# Patient Record
Sex: FEMALE | Race: BLACK OR AFRICAN AMERICAN | NOT HISPANIC OR LATINO
[De-identification: names, ages, dates, MRNs, and addresses within clinical notes are randomized per-mention and may not be internally consistent; named-entity substitution may affect disease eponyms.]

---

## 2017-02-08 ENCOUNTER — APPOINTMENT (OUTPATIENT)
Dept: ORTHOPEDIC SURGERY | Facility: CLINIC | Age: 52
End: 2017-02-08

## 2017-02-08 VITALS — SYSTOLIC BLOOD PRESSURE: 151 MMHG | HEART RATE: 76 BPM | DIASTOLIC BLOOD PRESSURE: 99 MMHG

## 2017-02-08 VITALS — BODY MASS INDEX: 26.58 KG/M2 | WEIGHT: 150 LBS | HEIGHT: 63 IN

## 2017-02-08 DIAGNOSIS — Z78.9 OTHER SPECIFIED HEALTH STATUS: ICD-10-CM

## 2017-02-08 DIAGNOSIS — Z87.39 PERSONAL HISTORY OF OTHER DISEASES OF THE MUSCULOSKELETAL SYSTEM AND CONNECTIVE TISSUE: ICD-10-CM

## 2018-05-23 ENCOUNTER — APPOINTMENT (OUTPATIENT)
Dept: INTERNAL MEDICINE | Facility: CLINIC | Age: 53
End: 2018-05-23

## 2018-11-26 ENCOUNTER — INPATIENT (INPATIENT)
Facility: HOSPITAL | Age: 53
LOS: 1 days | Discharge: HOME HEALTH | End: 2018-11-28
Attending: HOSPITALIST | Admitting: HOSPITALIST
Payer: MEDICAID

## 2018-11-26 VITALS
SYSTOLIC BLOOD PRESSURE: 145 MMHG | TEMPERATURE: 98 F | DIASTOLIC BLOOD PRESSURE: 86 MMHG | RESPIRATION RATE: 16 BRPM | HEART RATE: 88 BPM | OXYGEN SATURATION: 100 %

## 2018-11-26 DIAGNOSIS — R13.12 DYSPHAGIA, OROPHARYNGEAL PHASE: ICD-10-CM

## 2018-11-26 DIAGNOSIS — E78.5 HYPERLIPIDEMIA, UNSPECIFIED: ICD-10-CM

## 2018-11-26 DIAGNOSIS — Z29.9 ENCOUNTER FOR PROPHYLACTIC MEASURES, UNSPECIFIED: ICD-10-CM

## 2018-11-26 DIAGNOSIS — R13.10 DYSPHAGIA, UNSPECIFIED: ICD-10-CM

## 2018-11-26 LAB
ALBUMIN SERPL ELPH-MCNC: 4.7 G/DL — SIGNIFICANT CHANGE UP (ref 3.3–5)
ALP SERPL-CCNC: 69 U/L — SIGNIFICANT CHANGE UP (ref 40–120)
ALT FLD-CCNC: 16 U/L — SIGNIFICANT CHANGE UP (ref 4–33)
AST SERPL-CCNC: 20 U/L — SIGNIFICANT CHANGE UP (ref 4–32)
BASOPHILS # BLD AUTO: 0.03 K/UL — SIGNIFICANT CHANGE UP (ref 0–0.2)
BASOPHILS NFR BLD AUTO: 0.7 % — SIGNIFICANT CHANGE UP (ref 0–2)
BILIRUB SERPL-MCNC: 0.7 MG/DL — SIGNIFICANT CHANGE UP (ref 0.2–1.2)
BUN SERPL-MCNC: 5 MG/DL — LOW (ref 7–23)
CALCIUM SERPL-MCNC: 9.9 MG/DL — SIGNIFICANT CHANGE UP (ref 8.4–10.5)
CHLORIDE SERPL-SCNC: 103 MMOL/L — SIGNIFICANT CHANGE UP (ref 98–107)
CO2 SERPL-SCNC: 25 MMOL/L — SIGNIFICANT CHANGE UP (ref 22–31)
CREAT SERPL-MCNC: 0.84 MG/DL — SIGNIFICANT CHANGE UP (ref 0.5–1.3)
EOSINOPHIL # BLD AUTO: 0.02 K/UL — SIGNIFICANT CHANGE UP (ref 0–0.5)
EOSINOPHIL NFR BLD AUTO: 0.4 % — SIGNIFICANT CHANGE UP (ref 0–6)
GLUCOSE SERPL-MCNC: 87 MG/DL — SIGNIFICANT CHANGE UP (ref 70–99)
HCT VFR BLD CALC: 43.1 % — SIGNIFICANT CHANGE UP (ref 34.5–45)
HGB BLD-MCNC: 14.4 G/DL — SIGNIFICANT CHANGE UP (ref 11.5–15.5)
IMM GRANULOCYTES # BLD AUTO: 0.01 # — SIGNIFICANT CHANGE UP
IMM GRANULOCYTES NFR BLD AUTO: 0.2 % — SIGNIFICANT CHANGE UP (ref 0–1.5)
LYMPHOCYTES # BLD AUTO: 2.12 K/UL — SIGNIFICANT CHANGE UP (ref 1–3.3)
LYMPHOCYTES # BLD AUTO: 46.2 % — HIGH (ref 13–44)
MCHC RBC-ENTMCNC: 29.9 PG — SIGNIFICANT CHANGE UP (ref 27–34)
MCHC RBC-ENTMCNC: 33.4 % — SIGNIFICANT CHANGE UP (ref 32–36)
MCV RBC AUTO: 89.6 FL — SIGNIFICANT CHANGE UP (ref 80–100)
MONOCYTES # BLD AUTO: 0.32 K/UL — SIGNIFICANT CHANGE UP (ref 0–0.9)
MONOCYTES NFR BLD AUTO: 7 % — SIGNIFICANT CHANGE UP (ref 2–14)
NEUTROPHILS # BLD AUTO: 2.09 K/UL — SIGNIFICANT CHANGE UP (ref 1.8–7.4)
NEUTROPHILS NFR BLD AUTO: 45.5 % — SIGNIFICANT CHANGE UP (ref 43–77)
NRBC # FLD: 0 — SIGNIFICANT CHANGE UP
PLATELET # BLD AUTO: 215 K/UL — SIGNIFICANT CHANGE UP (ref 150–400)
PMV BLD: 11.4 FL — SIGNIFICANT CHANGE UP (ref 7–13)
POTASSIUM SERPL-MCNC: 3.9 MMOL/L — SIGNIFICANT CHANGE UP (ref 3.5–5.3)
POTASSIUM SERPL-SCNC: 3.9 MMOL/L — SIGNIFICANT CHANGE UP (ref 3.5–5.3)
PROT SERPL-MCNC: 7.9 G/DL — SIGNIFICANT CHANGE UP (ref 6–8.3)
RBC # BLD: 4.81 M/UL — SIGNIFICANT CHANGE UP (ref 3.8–5.2)
RBC # FLD: 13.7 % — SIGNIFICANT CHANGE UP (ref 10.3–14.5)
SODIUM SERPL-SCNC: 141 MMOL/L — SIGNIFICANT CHANGE UP (ref 135–145)
WBC # BLD: 4.59 K/UL — SIGNIFICANT CHANGE UP (ref 3.8–10.5)
WBC # FLD AUTO: 4.59 K/UL — SIGNIFICANT CHANGE UP (ref 3.8–10.5)

## 2018-11-26 PROCEDURE — 70491 CT SOFT TISSUE NECK W/DYE: CPT | Mod: 26

## 2018-11-26 PROCEDURE — 99223 1ST HOSP IP/OBS HIGH 75: CPT

## 2018-11-26 PROCEDURE — 70360 X-RAY EXAM OF NECK: CPT | Mod: 26

## 2018-11-26 PROCEDURE — 71046 X-RAY EXAM CHEST 2 VIEWS: CPT | Mod: 26

## 2018-11-26 RX ORDER — SODIUM CHLORIDE 9 MG/ML
1000 INJECTION INTRAMUSCULAR; INTRAVENOUS; SUBCUTANEOUS ONCE
Qty: 0 | Refills: 0 | Status: COMPLETED | OUTPATIENT
Start: 2018-11-26 | End: 2018-11-26

## 2018-11-26 RX ORDER — SODIUM CHLORIDE 9 MG/ML
1000 INJECTION, SOLUTION INTRAVENOUS
Qty: 0 | Refills: 0 | Status: DISCONTINUED | OUTPATIENT
Start: 2018-11-26 | End: 2018-11-27

## 2018-11-26 RX ADMIN — SODIUM CHLORIDE 1000 MILLILITER(S): 9 INJECTION INTRAMUSCULAR; INTRAVENOUS; SUBCUTANEOUS at 18:28

## 2018-11-26 NOTE — H&P ADULT - PROBLEM SELECTOR PLAN 3
IMPROVE VTE Individual Risk Assessment    RISK                                                          Points  [] Previous VTE                                           3  [] Thrombophilia                                        2  [] Lower limb paralysis                              2   [] Current Cancer                                       2   [] Immobilization > 24 hrs                        1  [] ICU/CCU stay > 24 hours                       1  [] Age > 60                                                   1    IMPROVE VTE Score: 0  scd

## 2018-11-26 NOTE — H&P ADULT - HISTORY OF PRESENT ILLNESS
54 yo f who initially  reports several days of increases saliva production. On further query, pt reports difficulty with swallowing liquids or solids even right after spitting out saliva. Difficulty at times marked by regurgitation of po content immediately after swallowing. Denies pain with swallowing, fevers, rigors, nausea, vomiting, abdominal pain, weight loss. CT neck + for  8mm  sialolith with no evidence of sialoadenitis. Pt reports felling lump there but denies pain or swelling 52 yo f who initially  reports several days of increases saliva production. On further query, pt reports difficulty with swallowing liquids or solids even right after spitting out saliva. Difficulty at times marked by regurgitation of po content immediately after swallowing. Denies pain with swallowing, fevers, rigors, nausea, vomiting, abdominal pain, weight loss. Pt with limited po intake over last 4 days. CT neck + for  8mm  sialolith with no evidence of sialoadenitis. Pt reports felling lump there but denies pain or swelling.   Pt with h/o right knee OA, HLD

## 2018-11-26 NOTE — ED PROVIDER NOTE - OBJECTIVE STATEMENT
53y F with no significant PMHx presents to the ED with 3 days of increased mucous production. States subjective difficulty swallowing due to amount of mucous. Pt is swallowing secretions. Denies pain. No cough or SOB. No trauma. No recent URI 53y F with no significant PMHx presents to the ED with 3 days of increased mucous production. States subjective difficulty swallowing due to amount of mucous. Pt though  is  able to swallow secretions. Denies pain. No cough or SOB. No trauma. No recent URI, denies fb ingestion

## 2018-11-26 NOTE — ED PROVIDER NOTE - PROGRESS NOTE DETAILS
CHAYO Izaguirre: Pt was given a cup of water and crackers and pt was not able to swallow.  Pt states she feels as though she is unable to get water down, but does not have any pain.  Given inability to tolerate PO liquids and solids, will admit.  Neuro consulted at this time.  Pt states she has not been able to eat any food for past three days and today was having difficulty swallowing gatorade. CHAYO CROWELL:  Pt accepted to Dr. Mccall.   MAR text paged at this time.

## 2018-11-26 NOTE — H&P ADULT - NSHPREVIEWOFSYSTEMS_GEN_ALL_CORE
Review of Systems:   CONSTITUTIONAL: No fever, weight loss, or fatigue  EYES: No eye pain, visual disturbances, or discharge  ENMT:  No difficulty hearing, tinnitus, vertigo; No sinus or throat pain  NECK: No pain or stiffness, mild prominence in left anterior neck  RESPIRATORY: No cough, wheezing, chills or hemoptysis; No shortness of breath  CARDIOVASCULAR: No chest pain, palpitations, dizziness, or leg swelling  GASTROINTESTINAL: + dysphagia . No abdominal or epigastric pain. No nausea, vomiting, or hematemesis; No diarrhea or constipation. No melena or hematochezia.  GENITOURINARY: No dysuria, frequency, hematuria, or incontinence  NEUROLOGICAL: No headaches, memory loss, loss of strength, numbness, or tremors  SKIN: No itching, burning, rashes, or lesions   LYMPH NODES: No enlarged glands  ENDOCRINE: No heat or cold intolerance; No hair loss  MUSCULOSKELETAL: No joint pain or swelling; No muscle, back, or extremity pain

## 2018-11-26 NOTE — H&P ADULT - PROBLEM SELECTOR PLAN 1
-would order MBS, will email GI   -IVF for hydration in setting of limited po intake as well as salivary stone  -npo for now

## 2018-11-26 NOTE — H&P ADULT - NSHPPHYSICALEXAM_GEN_ALL_CORE
PHYSICAL EXAM:      Constitutional: NAD, well-groomed, well-developed  HEENT: EOMI, Normal Hearing  Neck: left cervical anterior subcentimeter prominence   Back: Normal spine flexure, No CVA tenderness  Respiratory: CTAB  Cardiovascular: S1 and S2  Gastrointestinal: BS+, soft, NT/ND  Extremities: No peripheral edema  Vascular: 2+ peripheral pulses  Neurological: A/O x 3, no focal deficits  Psychiatric: Normal mood, normal affect  Musculoskeletal: 5/5 strength b/l upper and lower extremities  Skin: No rashes

## 2018-11-26 NOTE — ED PROVIDER NOTE - MEDICAL DECISION MAKING DETAILS
Increased mucous production. Subjective difficulty swallowing. Check CXR and XR of soft tissue neck. If negative dc home to follow up with ENT

## 2018-11-26 NOTE — ED PROVIDER NOTE - ENMT, MLM
Oropharynx no erythema, edema, or exudate. No masses palpated. Thyroid no goiter. Thyroid not enlarged

## 2018-11-26 NOTE — ED PROVIDER NOTE - NS_ ATTENDINGSCRIBEDETAILS _ED_A_ED_FT
The scribe's documentation has been prepared under my direction and personally reviewed by me in its entirety. I confirm that the note above accurately reflects all work, treatment, procedures, and medical decision making performed by Sam Holman MD

## 2018-11-26 NOTE — ED PROVIDER NOTE - CONSTITUTIONAL, MLM
normal... Well appearing, well nourished, awake, alert, oriented to person, place, time/situation and in no apparent distress. Moderate obesity

## 2018-11-26 NOTE — ED PROVIDER NOTE - ATTENDING CONTRIBUTION TO CARE
DR. MUJICA, UPFRONT ATTENDING MD-  I was the attending upfront in Triage today and I performed the initial face to face bedside interview with patient regarding history of present illness, review of symptoms and past medical history. I completed an independent physical exam.  Since I was the inital provider who evalauted this patient, the history, ROS and examination was documented by me in the note.  I have signed out the follow up of any pending tests (ie. labs and/or radiological studies) to the PA.  I have discussed patient's plan of care and disposition with PA.

## 2018-11-26 NOTE — H&P ADULT - NSHPLABSRESULTS_GEN_ALL_CORE
Vital Signs Last 24 Hrs  T(C): 36.1 (26 Nov 2018 19:32), Max: 36.9 (26 Nov 2018 13:44)  T(F): 97 (26 Nov 2018 19:32), Max: 98.5 (26 Nov 2018 13:44)  HR: 78 (26 Nov 2018 19:32) (78 - 88)  BP: 150/97 (26 Nov 2018 19:32) (141/91 - 150/97)  BP(mean): --  RR: 18 (26 Nov 2018 19:32) (16 - 18)  SpO2: 100% (26 Nov 2018 19:32) (100% - 100%)                            14.4   4.59  )-----------( 215      ( 26 Nov 2018 18:10 )             43.1     11-26    141  |  103  |  5<L>  ----------------------------<  87  3.9   |  25  |  0.84    Ca    9.9      26 Nov 2018 18:10    TPro  7.9  /  Alb  4.7  /  TBili  0.7  /  DBili  x   /  AST  20  /  ALT  16  /  AlkPhos  69  11-26    CAPILLARY BLOOD GLUCOSE

## 2018-11-26 NOTE — ED ADULT TRIAGE NOTE - CHIEF COMPLAINT QUOTE
pt comes to ED for SOB for 3 days related to having phlegm in throat. pt VSS pt took OTC without relief. pt resp even and unlabored. EKG obtained pt sating 100% on RA

## 2018-11-27 ENCOUNTER — TRANSCRIPTION ENCOUNTER (OUTPATIENT)
Age: 53
End: 2018-11-27

## 2018-11-27 LAB — GLUCOSE BLDC GLUCOMTR-MCNC: 98 MG/DL — SIGNIFICANT CHANGE UP (ref 70–99)

## 2018-11-27 PROCEDURE — 99221 1ST HOSP IP/OBS SF/LOW 40: CPT | Mod: GC

## 2018-11-27 PROCEDURE — 99233 SBSQ HOSP IP/OBS HIGH 50: CPT | Mod: GC

## 2018-11-27 PROCEDURE — 74230 X-RAY XM SWLNG FUNCJ C+: CPT | Mod: 26

## 2018-11-27 RX ORDER — INFLUENZA VIRUS VACCINE 15; 15; 15; 15 UG/.5ML; UG/.5ML; UG/.5ML; UG/.5ML
0.5 SUSPENSION INTRAMUSCULAR ONCE
Qty: 0 | Refills: 0 | Status: DISCONTINUED | OUTPATIENT
Start: 2018-11-27 | End: 2018-11-28

## 2018-11-27 NOTE — SWALLOW VFSS/MBS ASSESSMENT ADULT - DIAGNOSTIC IMPRESSIONS
Patient presented with functional oropharyngeal stage of swallow for puree, solids, and thin liquids marked by adequate bolus formation, transfer, and clearance, prompt swallow trigger, adequate hyolaryngeal elevation, adequate epiglottic deflection, adequate closure of the laryngeal vestibule and no penetration/aspiration/stasis before, during, or after the swallow.

## 2018-11-27 NOTE — CONSULT NOTE ADULT - SUBJECTIVE AND OBJECTIVE BOX
Attempted to see patient to get history and perform physical exam, patient perseverated in saying that "he [the ENT specialist] was supposed to come this morning" and refused exam. Patient appeared comfortable with a clear voice and no difficulty tolerating secretions when seen. CT neck was reviewed and showed "asymmetric fatty atrophy of the left submandibular gland with an adjacent 8 mm sialolith" which, if anything, would theoretically cause decreased saliva production. The CT neck is otherwise normal and so was patient's MBS study arguing against any structural cause for patient's dysphagia so a flexible laryngoscopy would be highly unlikely to yield any finding. May reconsult ENT as needed. Discussed with attending.

## 2018-11-27 NOTE — DISCHARGE NOTE ADULT - PLAN OF CARE
Management of symptoms You came in complaining of increased phlegm production. Imaging studies found an 8 mm sialolith. No evidence for acute sialoadenitis (inflammation). Neurology and ENT came to see you. Please follow up with your primary care provider 1-2 weeks after discharge. Continued treatment of hyperlipidemia You did not take your home medication for hyperlipidemia in the hospital. Please continue taking the medication as previously prescribed at home. Please follow up with your primary care physician for continued care. You came in complaining of increased phlegm production. Imaging studies found an 8 mm sialolith. No evidence for acute sialoadenitis (inflammation). Neurology and ENT came to see you but you declined exam by ENT resident - no need for endoscopy at this time. Please follow up with your primary care provider 1-2 weeks after discharge. As long as you can swallow and are not vomiting, continue regular meals. You came in complaining of increased phlegm production. Imaging studies found an 8 mm sialolith. No evidence for acute sialoadenitis (inflammation). Neurology and ENT came to see you but you declined exam by ENT resident - no need for endoscopy at this time. Please follow up with your primary care provider 1-2 weeks after discharge. As long as you can swallow and are not vomiting, continue regular meals. If you find yourself choking or difficulty breathing that does not resolve, please go to the ED. You came in complaining of increased phlegm production. Imaging studies found an 8 mm sialolith. No evidence for acute sialoadenitis (inflammation). Neurology and ENT came to see you but you declined exam by ENT resident - no need for endoscopy at this time. You had a barium swallow study done, which showed no abnormalities. Please follow up with your primary care provider 1-2 weeks after discharge. As long as you can swallow and are not vomiting, continue regular meals. If you find yourself choking or difficulty breathing that does not resolve, please go to the ED.

## 2018-11-27 NOTE — CONSULT NOTE ADULT - CONSULT REASON
dysphagia , increased saliva production with finding of an 8mm sialolith with no evidence of sialoadenitis on CT neck
acute dysphagia

## 2018-11-27 NOTE — CONSULT NOTE ADULT - SUBJECTIVE AND OBJECTIVE BOX
Neurology Consult    Name: DEANNA CHUNG    HPI: 52 yo woman who presents to [facility] w/ 4 day onset of no PO intake due to trouble swallowing (liquids and solids), preceded by several days of increased salivary production. Othwewise unremarkable for.....       Denies pain with swallowing, fevers, rigors, nausea, vomiting, abdominal pain, weight loss. Pt with limited po intake over last 4 days. CT neck + for  8mm  sialolith with no evidence of sialoadenitis. Pt reports felling lump there but denies pain or swelling.       PMH/PSH:   HLD   OA (right knee)     MEDICATIONS  (STANDING):  sodium chloride 0.45%. 1000 milliLiter(s) (75 mL/Hr) IV Continuous <Continuous>    MEDICATIONS  (PRN):      Allergies    No Known Allergies    Intolerances        Objective:   Vital Signs Last 24 Hrs  T(C): 36.7 (26 Nov 2018 22:58), Max: 36.9 (26 Nov 2018 13:44)  T(F): 98.1 (26 Nov 2018 22:58), Max: 98.5 (26 Nov 2018 13:44)  HR: 66 (27 Nov 2018 00:05) (66 - 88)  BP: 128/75 (27 Nov 2018 00:05) (128/75 - 150/97)  BP(mean): --  RR: 18 (27 Nov 2018 00:05) (16 - 18)  SpO2: 100% (27 Nov 2018 00:05) (100% - 100%)    General Exam:   General appearance: No acute distress                   Neurological Exam:  Mental Status: AAOx3, fluent speech, follows commands    Cranial Nerves: EOMI, PERRL, V1-V3 intact, facial symmetry intact, no dysarthria, tongue midline, VFF    Motor: 5/5 throughout. No drift x4    Sensation: Intact to LT throughout    Coordination: FTN intact b/l    Reflexes: 1+ bilateral biceps, brachioradialis, patellar and ankle      Gait: normal and stable.      Labs:    11-26    141  |  103  |  5<L>  ----------------------------<  87  3.9   |  25  |  0.84    Ca    9.9      26 Nov 2018 18:10    TPro  7.9  /  Alb  4.7  /  TBili  0.7  /  DBili  x   /  AST  20  /  ALT  16  /  AlkPhos  69  11-26    LIVER FUNCTIONS - ( 26 Nov 2018 18:10 )  Alb: 4.7 g/dL / Pro: 7.9 g/dL / ALK PHOS: 69 u/L / ALT: 16 u/L / AST: 20 u/L / GGT: x           CBC Full  -  ( 26 Nov 2018 18:10 )  WBC Count : 4.59 K/uL  Hemoglobin : 14.4 g/dL  Hematocrit : 43.1 %  Platelet Count - Automated : 215 K/uL  Mean Cell Volume : 89.6 fL  Mean Cell Hemoglobin : 29.9 pg  Mean Cell Hemoglobin Concentration : 33.4 %  Auto Neutrophil # : 2.09 K/uL  Auto Lymphocyte # : 2.12 K/uL  Auto Monocyte # : 0.32 K/uL  Auto Eosinophil # : 0.02 K/uL  Auto Basophil # : 0.03 K/uL  Auto Neutrophil % : 45.5 %  Auto Lymphocyte % : 46.2 %  Auto Monocyte % : 7.0 %  Auto Eosinophil % : 0.4 %  Auto Basophil % : 0.7 %      Radiology    < from: CT Neck Soft Tissue w/ IV Cont (11.26.18 @ 20:29) >  IMPRESSION:   Asymmetric fatty atrophy of the left submandibular gland with an adjacent   8 mm sialolith.  No evidence for acute sialoadenitis.    No CT evidence of neck mass, fluid collection, inflammatory change, soft   tissue gas or radiopaque foreign body.    < end of copied text > Neurology Consult    Name: DEANNA CHUNG    HPI: 52 yo woman who presents to [facility] w/ 4 day onset of no PO intake due to trouble swallowing (liquids and solids), preceded by several days of increased salivary production. Otherwise unremarkable for headache, acute vision changes, LOC, chest pain, SOB, abdominal pain, loss of bowel/bladder, focal weakness, numbness, tingling. Patient's only vascular risk factor is HLD.       PMH/PSH:   HLD   OA (right knee)     MEDICATIONS  (STANDING):  sodium chloride 0.45%. 1000 milliLiter(s) (75 mL/Hr) IV Continuous <Continuous>    MEDICATIONS  (PRN):    Allergies  No Known Allergies    Objective:   Vital Signs Last 24 Hrs  T(C): 36.7 (26 Nov 2018 22:58), Max: 36.9 (26 Nov 2018 13:44)  T(F): 98.1 (26 Nov 2018 22:58), Max: 98.5 (26 Nov 2018 13:44)  HR: 66 (27 Nov 2018 00:05) (66 - 88)  BP: 128/75 (27 Nov 2018 00:05) (128/75 - 150/97)  BP(mean): --  RR: 18 (27 Nov 2018 00:05) (16 - 18)  SpO2: 100% (27 Nov 2018 00:05) (100% - 100%)    General Exam:   General appearance: No acute distress                   Neurological Exam:  Mental Status: AAOx3 person place time , fluent speech repetition intact names objects, follows commands    Cranial Nerves: EOMI no nystagmus no diplopia, PERRL, V1-V3 intact, facial symmetry intact, subtle dysarthria, tongue midline, VFF    Motor: 5/5 throughout including neck flexion/extension. No drift x4    Sensation: Intact to LT throughout    Coordination: FTN intact b/l    Reflexes: 1+ bilateral biceps, brachioradialis, patellar and ankle    Gait: normal and stable.      Labs:    11-26    141  |  103  |  5<L>  ----------------------------<  87  3.9   |  25  |  0.84    Ca    9.9      26 Nov 2018 18:10    TPro  7.9  /  Alb  4.7  /  TBili  0.7  /  DBili  x   /  AST  20  /  ALT  16  /  AlkPhos  69  11-26    LIVER FUNCTIONS - ( 26 Nov 2018 18:10 )  Alb: 4.7 g/dL / Pro: 7.9 g/dL / ALK PHOS: 69 u/L / ALT: 16 u/L / AST: 20 u/L / GGT: x           CBC Full  -  ( 26 Nov 2018 18:10 )  WBC Count : 4.59 K/uL  Hemoglobin : 14.4 g/dL  Hematocrit : 43.1 %  Platelet Count - Automated : 215 K/uL  Mean Cell Volume : 89.6 fL  Mean Cell Hemoglobin : 29.9 pg  Mean Cell Hemoglobin Concentration : 33.4 %  Auto Neutrophil # : 2.09 K/uL  Auto Lymphocyte # : 2.12 K/uL  Auto Monocyte # : 0.32 K/uL  Auto Eosinophil # : 0.02 K/uL  Auto Basophil # : 0.03 K/uL  Auto Neutrophil % : 45.5 %  Auto Lymphocyte % : 46.2 %  Auto Monocyte % : 7.0 %  Auto Eosinophil % : 0.4 %  Auto Basophil % : 0.7 %      Radiology    < from: CT Neck Soft Tissue w/ IV Cont (11.26.18 @ 20:29) >  IMPRESSION:   Asymmetric fatty atrophy of the left submandibular gland with an adjacent   8 mm sialolith.  No evidence for acute sialoadenitis.    No CT evidence of neck mass, fluid collection, inflammatory change, soft   tissue gas or radiopaque foreign body.    < end of copied text >

## 2018-11-27 NOTE — DISCHARGE NOTE ADULT - OTHER SIGNIFICANT FINDINGS
< from: CT Neck Soft Tissue w/ IV Cont (11.26.18 @ 20:29) >  IMPRESSION:   Asymmetric fatty atrophy of the left submandibular gland with an adjacent   8 mm sialolith.  No evidence for acute sialoadenitis.    No CT evidence of neck mass, fluid collection, inflammatory change, soft   tissue gas or radiopaque foreign body.    < end of copied text > < from: CT Neck Soft Tissue w/ IV Cont (11.26.18 @ 20:29) >  IMPRESSION:   Asymmetric fatty atrophy of the left submandibular gland with an adjacent   8 mm sialolith.  No evidence for acute sialoadenitis.    No CT evidence of neck mass, fluid collection, inflammatory change, soft   tissue gas or radiopaque foreign body.    < end of copied text >    Modified barium swallow study:  Patient presented with functional oropharyngeal stage of swallow for puree, solids, and thin liquids marked by adequate bolus formation, transfer, and clearance, prompt swallow trigger, adequate hyolaryngeal elevation, adequate epiglottic deflection, adequate closure of the laryngeal vestibule and no penetration/aspiration/stasis before, during, or after the swallow.

## 2018-11-27 NOTE — CONSULT NOTE ADULT - ATTENDING COMMENTS
Patient seen and examined with neurology team and above note reviewed and I agree with assessment and plan as outlined. Patient with 2-3 days of reported dysphagia to solids and liquids and increased mucus production. Patient without any associated neurologic symptoms to suggest neuromuscular junction disorder or motor neuron disease or brain stem involvement.  I agree with ENT eval to assess for any structural or anatomic lesion and continue medical management and supportive care.  Follow up on MBS study and all questions addressed with patient and medical team.

## 2018-11-27 NOTE — DISCHARGE NOTE ADULT - PROVIDER TOKENS
FREE:[LAST:[Maria D],FIRST:[George],PHONE:[phone]],FAX:[(   [phone]    -],ADDRESS:[[address] [place] [address]]]

## 2018-11-27 NOTE — SWALLOW VFSS/MBS ASSESSMENT ADULT - COMMENTS
Patient seen in radiology suite for modified barium swallow study with radiologist present.  Patient seated upright in CHRISTIANE chair.  Patient able to self-feed during today's evaluation. Patient reports about 3-4 days ago, she began to have severe phlegm when eating/drinking with excessive burping.

## 2018-11-27 NOTE — CONSULT NOTE ADULT - PROBLEM SELECTOR RECOMMENDATION 9
of unknown etiology. Overall neuro exam non-focal.   Plan:   -ENT evaluation  -Speech and swallow evaluation   -r/o structural etiology   -neuro checks for acute worsening, may need additional workup if symptoms persist or progress.   -will continue to follow of unknown etiology. Overall neuro exam non-focal.   Plan:   -ENT evaluation  -Speech and swallow evaluation   -r/o structural etiology   -neuro checks for acute worsening, may need additional workup if symptoms persist or progress.   -consider NIF/VC checks if dysphagia/dysarthria worsens  -will continue to follow

## 2018-11-27 NOTE — DISCHARGE NOTE ADULT - CONDITIONS AT DISCHARGE
She is alert and oriented x 4, self sufficient with her care; she is able to tolerate her diet despite c/o mild dysphagia ; Cineesophagogram was done ; Skin is intact ; Vital Signs are stable and discharge Instructions are given.

## 2018-11-27 NOTE — DISCHARGE NOTE ADULT - CARE PLAN
Principal Discharge DX:	Oropharyngeal dysphagia  Goal:	Management of symptoms  Assessment and plan of treatment:	You came in complaining of increased phlegm production. Imaging studies found an 8 mm sialolith. No evidence for acute sialoadenitis (inflammation). Neurology and ENT came to see you. Please follow up with your primary care provider 1-2 weeks after discharge.  Secondary Diagnosis:	HLD (hyperlipidemia)  Goal:	Continued treatment of hyperlipidemia  Assessment and plan of treatment:	You did not take your home medication for hyperlipidemia in the hospital. Please continue taking the medication as previously prescribed at home. Please follow up with your primary care physician for continued care. Principal Discharge DX:	Oropharyngeal dysphagia  Goal:	Management of symptoms  Assessment and plan of treatment:	You came in complaining of increased phlegm production. Imaging studies found an 8 mm sialolith. No evidence for acute sialoadenitis (inflammation). Neurology and ENT came to see you but you declined exam by ENT resident - no need for endoscopy at this time. Please follow up with your primary care provider 1-2 weeks after discharge. As long as you can swallow and are not vomiting, continue regular meals.  Secondary Diagnosis:	HLD (hyperlipidemia)  Goal:	Continued treatment of hyperlipidemia  Assessment and plan of treatment:	You did not take your home medication for hyperlipidemia in the hospital. Please continue taking the medication as previously prescribed at home. Please follow up with your primary care physician for continued care. Principal Discharge DX:	Oropharyngeal dysphagia  Goal:	Management of symptoms  Assessment and plan of treatment:	You came in complaining of increased phlegm production. Imaging studies found an 8 mm sialolith. No evidence for acute sialoadenitis (inflammation). Neurology and ENT came to see you but you declined exam by ENT resident - no need for endoscopy at this time. Please follow up with your primary care provider 1-2 weeks after discharge. As long as you can swallow and are not vomiting, continue regular meals. If you find yourself choking or difficulty breathing that does not resolve, please go to the ED.  Secondary Diagnosis:	HLD (hyperlipidemia)  Goal:	Continued treatment of hyperlipidemia  Assessment and plan of treatment:	You did not take your home medication for hyperlipidemia in the hospital. Please continue taking the medication as previously prescribed at home. Please follow up with your primary care physician for continued care. Principal Discharge DX:	Oropharyngeal dysphagia  Goal:	Management of symptoms  Assessment and plan of treatment:	You came in complaining of increased phlegm production. Imaging studies found an 8 mm sialolith. No evidence for acute sialoadenitis (inflammation). Neurology and ENT came to see you but you declined exam by ENT resident - no need for endoscopy at this time. You had a barium swallow study done, which showed no abnormalities. Please follow up with your primary care provider 1-2 weeks after discharge. As long as you can swallow and are not vomiting, continue regular meals. If you find yourself choking or difficulty breathing that does not resolve, please go to the ED.  Secondary Diagnosis:	HLD (hyperlipidemia)  Goal:	Continued treatment of hyperlipidemia  Assessment and plan of treatment:	You did not take your home medication for hyperlipidemia in the hospital. Please continue taking the medication as previously prescribed at home. Please follow up with your primary care physician for continued care.

## 2018-11-27 NOTE — CONSULT NOTE ADULT - ASSESSMENT
52 yo woman who presents to [facility] w/ 4 day onset of no PO intake due to trouble swallowing (liquids and solids), preceded by several days of increased salivary production. Otherwise unremarkable for headache, acute vision changes, LOC, chest pain, SOB, abdominal pain, loss of bowel/bladder, focal weakness, numbness, tingling. Patient's only vascular risk factor is HLD.

## 2018-11-27 NOTE — DISCHARGE NOTE ADULT - ADDITIONAL INSTRUCTIONS
Please follow up with your primary care physician within 1-2 weeks after discharge. Please bring a copy of this discharge summary.

## 2018-11-27 NOTE — PROGRESS NOTE ADULT - PROBLEM SELECTOR PLAN 3
IMPROVE score 0  Currently on SCD    RISK                                                          Points  [] Previous VTE                                           3  [] Thrombophilia                                        2  [] Lower limb paralysis                              2   [] Current Cancer                                       2   [] Immobilization > 24 hrs                        1  [] ICU/CCU stay > 24 hours                       1  [] Age > 60                                                   1

## 2018-11-27 NOTE — DISCHARGE NOTE ADULT - MEDICATION SUMMARY - MEDICATIONS TO TAKE
I will START or STAY ON the medications listed below when I get home from the hospital:    pravastatin  -- Indication: For HLD (hyperlipidemia)

## 2018-11-27 NOTE — PROGRESS NOTE ADULT - PROBLEM SELECTOR PLAN 1
- continue with IVF for hydration in setting of limited po intake as well as salivary stone  - NPO for now  - f/u GI consult Modified barium swallow no abnormal findings.  - continue with IVF for hydration in setting of limited po intake as well as salivary stone  - NPO for now  - f/u GI consult  - f/u ENT consult

## 2018-11-27 NOTE — PROGRESS NOTE ADULT - ASSESSMENT
54 yo F with Hx of HLD, who presents with oropharyngeal dysphagia and immediate regurgitation of PO content after swallowing for 4 days. Currently NPO awaiting cinesophagram.

## 2018-11-27 NOTE — DISCHARGE NOTE ADULT - PATIENT PORTAL LINK FT
You can access the [facility] Patient Portal, offered by [facility], by registering with the following website: [email or web address]

## 2018-11-27 NOTE — PATIENT PROFILE ADULT - STATED REASON FOR ADMISSION
Subjective: Oracio Parmar is a 51 y.o. male presents to the office today for ED follow up from [facility] on 10-10-18 for complaints of upper abdominal pain associated nausea vomiting, and diarrhea for approximately one week. He was prescribed zofran for nausea, carafate, and famotadine. He reports he tried but it really did not make a difference. He is still taking the omeprazole, reports taking all prescribed medications . Review of Systems Constitutional: Negative for malaise/fatigue and weight loss. Weight gain Has been eating dill pickles because he reports it calms his stomach for the past week. Respiratory: Negative for cough, hemoptysis, sputum production, shortness of breath and wheezing. Cardiovascular: Negative. Gastrointestinal: Positive for heartburn. Negative for abdominal pain, constipation, diarrhea, nausea and vomiting. Wt Readings from Last 3 Encounters:  
10/16/18 231 lb 6.4 oz (105 kg) 10/09/18 216 lb 12.8 oz (98.3 kg) 12/12/17 215 lb (97.5 kg) BP Readings from Last 3 Encounters:  
10/16/18 130/72  
10/09/18 134/84  
12/12/17 107/76 Reviewed diagnostics with patient CT of abd/pelvis No bowel dilatation or acute inflammatory process. Normal appendix. Questionable few scattered colonic diverticula. 2. Trace bilateral pleural effusions, improved on the right, not significantly changed on the left. 3. Small hiatal hernia. 4. Cardiomegaly. Somewhat hyperenhancing arterial tree, bolus timing or possible low cardiac output/hypotension. 5. Mild subcutaneous and retroperitoneal fat stranding unchanged, not likely significant.  
EKG: 
Impression - ABNORMAL ECG -    
Impression SR-Sinus rhythm-normal P axis, V-rate 50-99    
Impression PLAE-Probable left atrial enlargement-P >50mS, <-0.10mV V1    
Impression LBBB-Left bundle branch block-QRSd>120, broad/notched R    
Impression NSC-No significant change since prior tracing-   
Troponin <0.01 
 difficulty swallowing Hgb 13.9 Hct 41.0 BMP potassium 4.0 Chloride 4.4 Calcium ionized 4.4 CO2 27.0 BUN 23 Creatinine 1.6 Current Outpatient Prescriptions Medication Sig Dispense Refill  famotidine (PEPCID) 20 mg tablet Take 20 mg by mouth two (2) times daily as needed.  sacubitril-valsartan (ENTRESTO) 24 mg/26 mg tablet Take 1 Tab by mouth two (2) times a day. 60 Tab 3  carvedilol (COREG) 6.25 mg tablet Take  by mouth two (2) times daily (with meals).  omeprazole (PRILOSEC) 20 mg capsule Take 1 Cap by mouth daily. 30 Cap 3  
 sildenafil, antihypertensive, (REVATIO) 20 mg tablet Take 20 mg by mouth three (3) times daily.  bumetanide (BUMEX) 1 mg tablet Take 1 Tab by mouth daily. (Patient taking differently: Take 1 mg by mouth two (2) times a day.) 30 Tab 6  potassium chloride SR (K-TAB) 20 mEq tablet 20 mEq. OBJECTIVE: 
Awake and alert in no acute distress Neck supple without lymphadenopathy, no carotid artery bruits auscultated bilaterally. Lungs clear throughout no wheezing, no labored respirations S1 S2 RRR without ectopy or murmur auscultated. Abdomen: normoactive bowel sounds all quadrants, no tenderness to abdomen upper and lower quadrants. No hepatosplenomegaly Extremities: no clubbing, cyanosis, peripheral edema Visit Vitals  /72 (BP 1 Location: Left arm, BP Patient Position: Sitting)  Pulse 88  Temp 97.9 °F (36.6 °C) (Oral)  Resp 22  
 Ht 5' 6\" (1.676 m)  Wt 231 lb 6.4 oz (105 kg)  SpO2 99%  BMI 37.35 kg/m2 Diagnoses and all orders for this visit: 
 
1. Hiatal hernia keep appointment with GI  
 
2. Congestive heart failure, unspecified HF chronicity, unspecified heart failure type [facility])  (cardiologist in [facility])  
-     sacubitril-valsartan (ENTRESTO) 24 mg/26 mg tablet; Take 1 Tab by mouth two (2) times a day. 3. Pulmonary HTN ([address] 4. Weight gain advised to take bumex tid until reevaluated on Friday of this week Anticipatory guidance regarding emergency care if symptoms worsen and patient verbalizes understanding. 5. Gastroesophageal reflux disease, esophagitis presence not specified continue omeprazole May take famotadine prn breakthrough heartburn GERD diet reviewed 6. Dietary noncompliance///counseling to adhere to  low sodium foods Patient agrees with plan and verbalizes understanding. I have discussed the diagnosis with the patient and the intended plan as seen in the above orders. The patient has received an after-visit summary and questions were answered concerning future plans. I have discussed medication side effects and warnings with the patient as well. Follow-up Disposition: 
Return in about 3 days (around 10/19/2018) for follow up weight check/CHF. Time with Pt 15 minutes, >50% of which was counseling pt regarding Dx and Tx options CHF, weight gain, low sodium diet, GERD, hiatal hernia, GERD diet and coordination of care.

## 2018-11-27 NOTE — DISCHARGE NOTE ADULT - HOSPITAL COURSE
History of Present Illness: 	  52 yo f who initially  reports several days of increases saliva production. On further query, pt reports difficulty with swallowing liquids or solids even right after spitting out saliva. Difficulty at times marked by regurgitation of po content immediately after swallowing. Denies pain with swallowing, fevers, rigors, nausea, vomiting, abdominal pain, weight loss. Pt with limited po intake over last 4 days. CT neck + for  8mm  sialolith with no evidence of sialoadenitis. Pt reports felling lump there but denies pain or swelling.   Pt with h/o right knee OA, HLD    Upon admission, patient had a modified barium swallow study, which showed no abnormalities. The patient was continued to be seen by neurology and ENT. Patient tolerated regular PO diet. History of Present Illness: 	  52 yo f who initially  reports several days of increases saliva production. On further query, pt reports difficulty with swallowing liquids or solids even right after spitting out saliva. Difficulty at times marked by regurgitation of po content immediately after swallowing. Denies pain with swallowing, fevers, rigors, nausea, vomiting, abdominal pain, weight loss. Pt with limited po intake over last 4 days. CT neck + for  8mm  sialolith with no evidence of sialoadenitis. Pt reports felling lump there but denies pain or swelling. Pt with h/o right knee OA, HLD    Upon admission, patient had a modified barium swallow study, which showed no abnormalities. The patient was continued to be seen by neurology and ENT who found no need for flex laryngoscopy. Patient tolerated regular PO diet without choking, vomiting, or pain. History of Present Illness: 	  52 yo f who initially  reports several days of increases saliva production. On further query, pt reports difficulty with swallowing liquids or solids even right after spitting out saliva. Difficulty at times marked by regurgitation of po content immediately after swallowing. Denies pain with swallowing, fevers, rigors, nausea, vomiting, abdominal pain, weight loss. Pt with limited po intake over last 4 days. CT neck + for  8mm  sialolith with no evidence of sialoadenitis. Pt reports felling lump there but denies pain or swelling. Pt with h/o right knee OA, HLD    Upon admission, patient had a modified barium swallow study, which showed no abnormalities. The patient was seen by neurology and ENT - it was determined that there is no need for flex laryngoscopy. Patient tolerated regular PO diet without choking, vomiting, or pain.

## 2018-11-27 NOTE — PROGRESS NOTE ADULT - SUBJECTIVE AND OBJECTIVE BOX
Dr. Gustabo Ellison, PGY1  Pager [phone]    DEANNA CHUNG  53y  MRN: [id number]    Subjective:  Patient is a 53y old  Female who presents with a chief complaint of dysphagia , increased saliva production (27 Nov 2018 01:04)      Overnight:       MEDICATIONS  (STANDING):  sodium chloride 0.45%. 1000 milliLiter(s) (75 mL/Hr) IV Continuous <Continuous>    MEDICATIONS  (PRN):        Objective:  Vitals: Vital Signs Last 24 Hrs  T(C): 36.9 (11-27-18 @ 06:06), Max: 36.9 (11-26-18 @ 13:44)  T(F): 98.4 (11-27-18 @ 06:06), Max: 98.5 (11-26-18 @ 13:44)  HR: 60 (11-27-18 @ 06:06) (60 - 88)  BP: 136/84 (11-27-18 @ 06:06) (128/75 - 150/97)  BP(mean): --  RR: 18 (11-27-18 @ 06:06) (16 - 18)  SpO2: 100% (11-27-18 @ 06:06) (100% - 100%)                I&O's Summary    26 Nov 2018 07:01  -  27 Nov 2018 07:00  --------------------------------------------------------  IN: 550 mL / OUT: 0 mL / NET: 550 mL          PHYSICAL EXAM:        LABS:  11-26    141  |  103  |  5<L>  ----------------------------<  87  3.9   |  25  |  0.84    Ca    9.9      26 Nov 2018 18:10    TPro  7.9  /  Alb  4.7  /  TBili  0.7  /  DBili  x   /  AST  20  /  ALT  16  /  AlkPhos  69  11-26                                              14.4   4.59  )-----------( 215      ( 26 Nov 2018 18:10 )             43.1     CAPILLARY BLOOD GLUCOSE      POCT Blood Glucose.: 98 mg/dL (27 Nov 2018 07:00)        RADIOLOGY & ADDITIONAL TESTS:  < from: CT Neck Soft Tissue w/ IV Cont (11.26.18 @ 20:29) >  IMPRESSION:   Asymmetric fatty atrophy of the left submandibular gland with an adjacent   8 mm sialolith.  No evidence for acute sialoadenitis.    No CT evidence of neck mass, fluid collection, inflammatory change, soft   tissue gas or radiopaque foreign body.    < end of copied text > Dr. Gustabo Ellison, PGY1  Pager [phone]    DEANNA CHUNG  53y  MRN: [id number]    Subjective:  Patient is a 53y old  Female who presents with a chief complaint of dysphagia , increased saliva production (27 Nov 2018 01:04)      Overnight:   Patient states that for 3-4 days she has had white phlegm coming up after eating or drinking. She is able to demonstrate by clearing her throat. Patient also endorses burping a lot more than normal. Because of the increased phlegm production, patient has been scared and has not eaten since Thursday, and has only been sipping small amounts of liquid (gatorade, water). Patient has been able to swallow and keep it all down. Patient denies sore throat, acidic taste in mouth, vomiting, cough, chest pain, runny nose, fever, sick contacts, abdominal pain. Patient denies eating more than usual on Thursday, or eating anything she usually does not eat. Patient went for modified barium swallow this morning, which was normal. Patient is still clearing her throat and producing white thin bubble phlegm. No nausea, no difficulty swallowing, no abdominal pain, no throat pain.     MEDICATIONS  (STANDING):  sodium chloride 0.45%. 1000 milliLiter(s) (75 mL/Hr) IV Continuous <Continuous>    MEDICATIONS  (PRN):        Objective:  Vitals: Vital Signs Last 24 Hrs  T(C): 36.9 (11-27-18 @ 06:06), Max: 36.9 (11-26-18 @ 13:44)  T(F): 98.4 (11-27-18 @ 06:06), Max: 98.5 (11-26-18 @ 13:44)  HR: 60 (11-27-18 @ 06:06) (60 - 88)  BP: 136/84 (11-27-18 @ 06:06) (128/75 - 150/97)  BP(mean): --  RR: 18 (11-27-18 @ 06:06) (16 - 18)  SpO2: 100% (11-27-18 @ 06:06) (100% - 100%)                I&O's Summary    26 Nov 2018 07:01  -  27 Nov 2018 07:00  --------------------------------------------------------  IN: 550 mL / OUT: 0 mL / NET: 550 mL          PHYSICAL EXAM:  Gen: NAD, appears comfortable  HEENT: NCAT, MMM, Throat clear, EOMI, clear conjunctiva, small left submandibular mass not tender   Neck: supple  Heart: S1S2+, RRR, no murmur, cap refill < 2 sec, 2+ peripheral pulses  Lungs: normal respiratory pattern, CTAB  Abd: soft, NT, ND, BSP  Ext: FROM, no edema, no tenderness  Neuro: no focal deficits, awake, alert          LABS:  11-26    141  |  103  |  5<L>  ----------------------------<  87  3.9   |  25  |  0.84    Ca    9.9      26 Nov 2018 18:10    TPro  7.9  /  Alb  4.7  /  TBili  0.7  /  DBili  x   /  AST  20  /  ALT  16  /  AlkPhos  69  11-26                                              14.4   4.59  )-----------( 215      ( 26 Nov 2018 18:10 )             43.1     CAPILLARY BLOOD GLUCOSE      POCT Blood Glucose.: 98 mg/dL (27 Nov 2018 07:00)        RADIOLOGY & ADDITIONAL TESTS:  < from: CT Neck Soft Tissue w/ IV Cont (11.26.18 @ 20:29) >  IMPRESSION:   Asymmetric fatty atrophy of the left submandibular gland with an adjacent   8 mm sialolith.  No evidence for acute sialoadenitis.    No CT evidence of neck mass, fluid collection, inflammatory change, soft   tissue gas or radiopaque foreign body.    < end of copied text >

## 2018-11-27 NOTE — SWALLOW VFSS/MBS ASSESSMENT ADULT - SLP PERTINENT HISTORY OF CURRENT PROBLEM
54 yo f with oropharyngeal dysphagia - several days of increased saliva production and regurgitation of PO content immediately after swallowing

## 2018-11-28 VITALS
OXYGEN SATURATION: 100 % | DIASTOLIC BLOOD PRESSURE: 96 MMHG | RESPIRATION RATE: 18 BRPM | HEART RATE: 68 BPM | SYSTOLIC BLOOD PRESSURE: 139 MMHG | TEMPERATURE: 98 F

## 2018-11-28 PROCEDURE — 99239 HOSP IP/OBS DSCHRG MGMT >30: CPT

## 2018-11-28 RX ORDER — FLUTICASONE PROPIONATE 50 MCG
1 SPRAY, SUSPENSION NASAL
Qty: 0 | Refills: 0 | Status: DISCONTINUED | OUTPATIENT
Start: 2018-11-28 | End: 2018-11-28

## 2018-11-28 RX ORDER — SODIUM CHLORIDE 0.65 %
1 AEROSOL, SPRAY (ML) NASAL
Qty: 0 | Refills: 0 | Status: DISCONTINUED | OUTPATIENT
Start: 2018-11-28 | End: 2018-11-28

## 2018-11-28 NOTE — PROGRESS NOTE ADULT - ASSESSMENT
52 yo F with Hx of HLD, who presents with oropharyngeal dysphagia and immediate regurgitation of PO content after swallowing for 4 days. Currently tolerating regular PO intake, but still complaining of white spit up

## 2018-11-28 NOTE — PROGRESS NOTE ADULT - PROBLEM SELECTOR PLAN 1
Modified barium swallow no abnormal findings. Tolerating regular diet  - regular diet  - ENT consult: patient refused exam yesterday and perseverated on them not coming in the morning and being a resident, not attending  - Warm packs, flonase, saline nasal spray

## 2018-11-28 NOTE — PROGRESS NOTE ADULT - SUBJECTIVE AND OBJECTIVE BOX
Dr. Gustabo Ellison, PGY1  Pager [phone]    DEANNA CHUNG  53y  MRN: [id number]    Subjective:  Patient is a 53y old  Female who presents with a chief complaint of dysphagia , increased saliva production (27 Nov 2018 20:30)      Overnight: No acute issues overnight. Patient refused to be examined by the ENT resident because they were a resident and not attending, and because they did not come in the morning. She states she tolerated regular food last night without SOB, no choking, no vomiting, no pain, however she did endorse continuing to have this white spit up.       MEDICATIONS  (STANDING):  influenza   Vaccine 0.5 milliLiter(s) IntraMuscular once    MEDICATIONS  (PRN):        Objective:  Vitals: Vital Signs Last 24 Hrs  T(C): 36.5 (11-28-18 @ 06:09), Max: 36.9 (11-27-18 @ 10:37)  T(F): 97.7 (11-28-18 @ 06:09), Max: 98.4 (11-27-18 @ 10:37)  HR: 67 (11-28-18 @ 06:09) (63 - 72)  BP: 142/85 (11-28-18 @ 06:09) (124/88 - 142/85)  BP(mean): --  RR: 18 (11-28-18 @ 06:09) (18 - 18)  SpO2: 100% (11-28-18 @ 06:09) (100% - 100%)                I&O's Summary        PHYSICAL EXAM:    Gen: NAD, appears comfortable  HEENT: NCAT, MMM, Throat clear, EOMI, clear conjunctiva, small left and right submandibular mass not tender  Neck: supple  Heart: S1S2+, RRR, no murmur, cap refill < 2 sec, 2+ peripheral pulses  Lungs: normal respiratory pattern, CTAB  Abd: soft, NT, ND, BSP  Ext: FROM, no edema, no tenderness  Neuro: no focal deficits, awake, alert                                      LABS:  11-26    141  |  103  |  5<L>  ----------------------------<  87  3.9   |  25  |  0.84    Ca    9.9      26 Nov 2018 18:10    TPro  7.9  /  Alb  4.7  /  TBili  0.7  /  DBili  x   /  AST  20  /  ALT  16  /  AlkPhos  69  11-26                                              14.4   4.59  )-----------( 215      ( 26 Nov 2018 18:10 )             43.1

## 2018-11-28 NOTE — PROGRESS NOTE ADULT - ATTENDING COMMENTS
Pt is well appearing, tolerating regular diet, no signs of respiratory distress.  Medically stable for d/c home today w/ PMD f/u.    35 min spent on dc planning.
Pt seen and examined on rounds with teams.   Pt denies dysphagia to solids or liquids, just discomfort from too much phlegm production and subjective SOB.   Pt had fruit cup w/o difficulty during my exam.  MBS wnl.   Will obtain ENT eval for sialolith.   Suspect pt will need supportive care, d/c planning depending on ENT recs.

## 2018-11-30 ENCOUNTER — TRANSCRIPTION ENCOUNTER (OUTPATIENT)
Age: 53
End: 2018-11-30

## 2020-04-26 ENCOUNTER — MESSAGE (OUTPATIENT)
Age: 55
End: 2020-04-26

## 2020-08-03 DIAGNOSIS — Z80.42 FAMILY HISTORY OF MALIGNANT NEOPLASM OF PROSTATE: ICD-10-CM

## 2020-08-03 DIAGNOSIS — Z82.49 FAMILY HISTORY OF ISCHEMIC HEART DISEASE AND OTHER DISEASES OF THE CIRCULATORY SYSTEM: ICD-10-CM

## 2020-08-25 ENCOUNTER — APPOINTMENT (OUTPATIENT)
Dept: GASTROENTEROLOGY | Facility: CLINIC | Age: 55
End: 2020-08-25
Payer: MEDICAID

## 2020-08-25 VITALS
HEART RATE: 77 BPM | BODY MASS INDEX: 30.12 KG/M2 | WEIGHT: 170 LBS | SYSTOLIC BLOOD PRESSURE: 152 MMHG | DIASTOLIC BLOOD PRESSURE: 93 MMHG | HEIGHT: 63 IN | TEMPERATURE: 97.1 F

## 2020-08-25 PROCEDURE — 99214 OFFICE O/P EST MOD 30 MIN: CPT

## 2020-08-25 PROCEDURE — 82274 ASSAY TEST FOR BLOOD FECAL: CPT | Mod: QW

## 2020-08-25 RX ORDER — IBUPROFEN 100 MG
TABLET,CHEWABLE ORAL
Refills: 0 | Status: DISCONTINUED | COMMUNITY
End: 2020-08-25

## 2020-08-25 RX ORDER — ASCORBIC ACID 500 MG
500-400 TABLET,CHEWABLE ORAL
Refills: 0 | Status: DISCONTINUED | COMMUNITY
End: 2020-08-25

## 2020-08-25 RX ORDER — MELOXICAM 7.5 MG/1
7.5 TABLET ORAL
Refills: 0 | Status: DISCONTINUED | COMMUNITY
End: 2020-08-25

## 2020-08-25 NOTE — REASON FOR VISIT
[Follow-Up: _____] : a [unfilled] follow-up visit [FreeTextEntry1] : Burping, increased saliva after taking Dexilant

## 2020-08-25 NOTE — ASSESSMENT
[FreeTextEntry1] : 1.  GERD-upper endoscopy November 4, 2016 revealed esophagitis and gastritis-recent increase in burping may be secondary to gastroesophageal reflux.\par 2.  Sialorrhea-rule out stone in a salivary gland; rule out drug-induced.\par 3.  Colonoscopy November 4, 2016 revealed internal hemorrhoids.\par 4.  Hypertension.\par 5.  Hypercholesterolemia.\par 6.  Status post LOUIE without oophorectomy.\par \par Plan:\par 1.  The patient was advised to continue Dexilant 60 mg once a day if this seems to be helping her burping and reflux symptoms.  If not, she can discontinue it.\par 2.  The patient was advised to see either a head and neck surgeon or another ENT physician.  She was given the names of a few physicians to see.

## 2020-08-25 NOTE — PHYSICAL EXAM
[General Appearance - Alert] : alert [General Appearance - In No Acute Distress] : in no acute distress [General Appearance - Well Developed] : well developed [General Appearance - Well-Appearing] : healthy appearing [General Appearance - Well Nourished] : well nourished [PERRL With Normal Accommodation] : pupils were equal in size, round, and reactive to light [Sclera] : the sclera and conjunctiva were normal [Outer Ear] : the ears and nose were normal in appearance [Extraocular Movements] : extraocular movements were intact [Strabismus] : no strabismus was seen [Examination Of The Oral Cavity] : the lips and gums were normal [Both Tympanic Membranes Were Examined] : both tympanic membranes were normal [Oropharynx] : the oropharynx was normal [Nasal Cavity] : the nasal mucosa and septum were normal [Neck Appearance] : the appearance of the neck was normal [Neck Cervical Mass (___cm)] : no neck mass was observed [Thyroid Diffuse Enlargement] : the thyroid was not enlarged [Thyroid Nodule] : there were no palpable thyroid nodules [Jugular Venous Distention Increased] : there was no jugular-venous distention [Auscultation Breath Sounds / Voice Sounds] : lungs were clear to auscultation bilaterally [Lungs Percussion] : the lungs were normal to percussion [Heart Sounds] : normal S1 and S2 [Heart Rate And Rhythm] : heart rate was normal and rhythm regular [Murmurs] : no murmurs [Heart Sounds Pericardial Friction Rub] : no pericardial rub [Heart Sounds Gallop] : no gallops [Abdominal Aorta] : the abdominal aorta was normal [Full Pulse] : the pedal pulses are present [Arterial Pulses Carotid] : carotid pulses were normal with no bruits [Breast Palpation Mass] : no palpable masses [Breast Abnormal Lactation (Galactorrhea)] : no nipple discharge [Edema] : there was no peripheral edema [Breast Appearance] : normal in appearance [Abdomen Tenderness] : non-tender [Abdomen Soft] : soft [Bowel Sounds] : normal bowel sounds [Abdomen Hernia] : no hernia was discovered [Abdomen Mass (___ Cm)] : no abdominal mass palpated [No Rectal Mass] : no rectal mass [Normal Sphincter Tone] : normal sphincter tone [Occult Blood Positive] : stool was negative for occult blood [FreeTextEntry1] : Stool brown, negative Hemoccult, negative iFOBT [Cervical Lymph Nodes Enlarged Posterior Bilaterally] : posterior cervical [Cervical Lymph Nodes Enlarged Anterior Bilaterally] : anterior cervical [Supraclavicular Lymph Nodes Enlarged Bilaterally] : supraclavicular [Axillary Lymph Nodes Enlarged Bilaterally] : axillary [Inguinal Lymph Nodes Enlarged Bilaterally] : inguinal [Femoral Lymph Nodes Enlarged Bilaterally] : femoral [No CVA Tenderness] : no ~M costovertebral angle tenderness [No Spinal Tenderness] : no spinal tenderness [Abnormal Walk] : normal gait [Nail Clubbing] : no clubbing  or cyanosis of the fingernails [Musculoskeletal - Swelling] : no joint swelling seen [Motor Tone] : muscle strength and tone were normal [Involuntary Movements] : no involuntary movements were seen [Skin Turgor] : normal skin turgor [Skin Color & Pigmentation] : normal skin color and pigmentation [Skin Lesions] : no skin lesions [] : no rash [No Focal Deficits] : no focal deficits [Impaired Insight] : insight and judgment were intact [Oriented To Time, Place, And Person] : oriented to person, place, and time [Affect] : the affect was normal [Mood] : the mood was normal

## 2020-08-25 NOTE — REVIEW OF SYSTEMS
[Joint Stiffness] : joint stiffness [Joint Pain] : joint pain [Difficulty Walking] : difficulty walking [Negative] : Endocrine [FreeTextEntry7] : burping, increased saliva

## 2020-08-25 NOTE — HISTORY OF PRESENT ILLNESS
[FreeTextEntry1] : In May, after eating a lot of legumes, Isabel started complaining of excessive burping.  She saw her primary care physician who gave her a prescription for omeprazole 20 mg.  This did not seem to help her burping, so he prescribed Reglan twice a day and sucralfate twice a day.  She continued to have burping, so he prescribed Dexilant 60 mg.  This has helped somewhat with her burping, but she has an increase in saliva all along.  She denies nausea or vomiting.  She saw an ENT physician who prescribed amoxicillin clavulanate, and he advised a sonogram of the neck.  She has not gotten the sonogram of the neck.  Reportedly, she had a similar episode 2 years ago, at which time she went [facility] and was told that she had a sialolith.  Eventually, her symptoms resolved until recently.  She has had no rectal bleeding her bowel habits are regular.

## 2020-08-25 NOTE — CONSULT LETTER
[( Thank you for referring [unfilled] for consultation for _____ )] : Thank you for referring [unfilled] for consultation for [unfilled] [Dear  ___] : Dear  [unfilled], [Consult Letter:] : I had the pleasure of evaluating your patient, [unfilled]. [Consult Closing:] : Thank you very much for allowing me to participate in the care of this patient.  If you have any questions, please do not hesitate to contact me. [Please see my note below.] : Please see my note below. [Sincerely,] : Sincerely, [FreeTextEntry3] : Facundo Jalloh MD

## 2020-10-15 ENCOUNTER — APPOINTMENT (OUTPATIENT)
Dept: OTOLARYNGOLOGY | Facility: CLINIC | Age: 55
End: 2020-10-15
Payer: MEDICAID

## 2020-10-15 VITALS — BODY MASS INDEX: 30.65 KG/M2 | WEIGHT: 173 LBS | HEART RATE: 88 BPM | HEIGHT: 63 IN

## 2020-10-15 VITALS — SYSTOLIC BLOOD PRESSURE: 137 MMHG | DIASTOLIC BLOOD PRESSURE: 93 MMHG

## 2020-10-15 DIAGNOSIS — H61.21 IMPACTED CERUMEN, RIGHT EAR: ICD-10-CM

## 2020-10-15 DIAGNOSIS — K11.7 DISTURBANCES OF SALIVARY SECRETION: ICD-10-CM

## 2020-10-15 PROCEDURE — 99204 OFFICE O/P NEW MOD 45 MIN: CPT | Mod: 25

## 2020-10-15 PROCEDURE — 69210 REMOVE IMPACTED EAR WAX UNI: CPT

## 2020-10-15 PROCEDURE — 31575 DIAGNOSTIC LARYNGOSCOPY: CPT

## 2020-10-15 RX ORDER — DEXLANSOPRAZOLE 60 MG/1
60 CAPSULE, DELAYED RELEASE ORAL
Refills: 0 | Status: DISCONTINUED | COMMUNITY
End: 2020-10-15

## 2020-10-15 NOTE — CONSULT LETTER
[Dear  ___] : Dear  [unfilled], [Consult Letter:] : I had the pleasure of evaluating your patient, [unfilled]. [Please see my note below.] : Please see my note below. [Consult Closing:] : Thank you very much for allowing me to participate in the care of this patient.  If you have any questions, please do not hesitate to contact me. [Sincerely,] : Sincerely, [FreeTextEntry3] : Bebo Willson MD, FACS \par  of Otolaryngology  \par [facility] \par [address] \[place] [address] \par Phone: [phone] \par Fax: [phone] \par \par

## 2020-10-15 NOTE — REASON FOR VISIT
[Initial Consultation] : an initial consultation for [Friend] : friend [FreeTextEntry2] : referred by Dr Facundo Jalloh, GI, for increase salivation

## 2020-10-15 NOTE — PROCEDURE
[de-identified] : Fiberoptic Laryngoscopy (32731)\par \par Procedure performed: Fiberoptic Laryngeal Endoscopy - Diagnostic\par Pre-op/post op indication: Dysphagia\par Patient was unable to cooperate with mirror. After informed verbal consent is obtained, the fiberoptic nasal endoscope # []  is passed via the  both] nasal cavity. \par Findings: base of tongue and vallecula clear, hypopharynx normal without pooled secretions, crisp epiglottis, no evidence of laryngomalacia, bilateral vocal fold mobility full and symmetric. There was  significant arytenoids edema and  erythema seen , without  mass or lesions..\par  [Cerumen Impaction] : Cerumen Impaction [Same] : same as the Pre Op Dx. [] : Removal of Cerumen [FreeTextEntry6] : Cerumen was removed under binocular microscopy from the right ear with a combination of a suction and/or a loop curette. The patient tolerated the procedure well and there were no complications. The  findings are noted above.\par

## 2020-10-15 NOTE — HISTORY OF PRESENT ILLNESS
[de-identified] : 55 year old female referred by Dr Facundo Jalloh, GI, for increased saliva for about 3 months.  Reports excessive burping since 05/2020, given omeprazole, reglan and sucralfate without relief, then prescribed dexilant with relief in burping but excessive saliva started. Reports having same issue in 2018, in which she was given Augmentin that decreased saliva. \par Denies dysphagia, odynophagia. \par

## 2020-10-22 PROBLEM — K11.7 SIALORRHEA: Status: ACTIVE | Noted: 2020-08-25

## 2020-11-05 ENCOUNTER — NON-APPOINTMENT (OUTPATIENT)
Age: 55
End: 2020-11-05

## 2020-12-09 NOTE — ED PROVIDER NOTE - PSH
Please call [phone] with any issues regarding this refill.   No significant past surgical history

## 2021-01-19 ENCOUNTER — APPOINTMENT (OUTPATIENT)
Dept: GASTROENTEROLOGY | Facility: CLINIC | Age: 56
End: 2021-01-19

## 2021-03-05 ENCOUNTER — EMERGENCY (EMERGENCY)
Facility: HOSPITAL | Age: 56
LOS: 0 days | Discharge: HOME | End: 2021-03-05
Payer: COMMERCIAL

## 2021-03-05 VITALS
DIASTOLIC BLOOD PRESSURE: 94 MMHG | SYSTOLIC BLOOD PRESSURE: 146 MMHG | RESPIRATION RATE: 19 BRPM | OXYGEN SATURATION: 97 % | HEART RATE: 92 BPM

## 2021-03-05 VITALS
HEIGHT: 62 IN | WEIGHT: 149.91 LBS | OXYGEN SATURATION: 98 % | HEART RATE: 102 BPM | SYSTOLIC BLOOD PRESSURE: 160 MMHG | RESPIRATION RATE: 18 BRPM | DIASTOLIC BLOOD PRESSURE: 115 MMHG | TEMPERATURE: 98 F
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
## 2021-03-05 DIAGNOSIS — E78.5 HYPERLIPIDEMIA, UNSPECIFIED: ICD-10-CM
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
## 2021-03-05 DIAGNOSIS — M25.561 PAIN IN RIGHT KNEE: ICD-10-CM

## 2021-03-05 DIAGNOSIS — M25.461 EFFUSION, RIGHT KNEE: ICD-10-CM

## 2021-03-05 PROCEDURE — 73562 X-RAY EXAM OF KNEE 3: CPT | Mod: 26,RT

## 2021-03-05 PROCEDURE — 99283 EMERGENCY DEPT VISIT LOW MDM: CPT

## 2021-03-05 RX ORDER — IBUPROFEN 200 MG
1 TABLET ORAL
Qty: 15 | Refills: 0
Start: 2021-03-05 | End: 2021-03-09

## 2021-03-05 RX ORDER — IBUPROFEN 200 MG
800 TABLET ORAL ONCE
Refills: 0 | Status: DISCONTINUED | OUTPATIENT
Start: 2021-03-05 | End: 2021-03-05
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
## 2021-03-05 NOTE — ED PROVIDER NOTE - MUSCULOSKELETAL, MLM
Spine appears normal, range of motion is not limited, no muscle or joint tenderness Right Knee- arom, no erythema, no warmth, non tender

## 2021-03-05 NOTE — ED PROVIDER NOTE - PATIENT PORTAL LINK FT
You can access the FollowMyHealth Patient Portal offered by [facility] by registering at the following website: [email or web address]. By joining [employer]’s FollowMyHealth portal, you will also be able to view your health information using other applications (apps) compatible with our system.

## 2021-03-05 NOTE — ED PROVIDER NOTE - CARE PROVIDER_API CALL
Goran Shepard (DO)  Orthopaedic Surgery  [address] [place] [address]  Phone: [phone]  Fax: [phone]  Established Patient  Follow Up Time:
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
## 2021-12-22 ENCOUNTER — EMERGENCY (EMERGENCY)
Facility: HOSPITAL | Age: 56
LOS: 1 days | Discharge: HOME | End: 2021-12-22
Attending: STUDENT IN AN ORGANIZED HEALTH CARE EDUCATION/TRAINING PROGRAM | Admitting: STUDENT IN AN ORGANIZED HEALTH CARE EDUCATION/TRAINING PROGRAM
Payer: MEDICAID

## 2021-12-22 VITALS
OXYGEN SATURATION: 100 % | RESPIRATION RATE: 18 BRPM | DIASTOLIC BLOOD PRESSURE: 85 MMHG | HEIGHT: 62 IN | TEMPERATURE: 98 F | SYSTOLIC BLOOD PRESSURE: 153 MMHG | HEART RATE: 78 BPM

## 2021-12-22 PROCEDURE — 93971 EXTREMITY STUDY: CPT | Mod: 26,LT

## 2021-12-22 PROCEDURE — 99284 EMERGENCY DEPT VISIT MOD MDM: CPT

## 2021-12-22 RX ORDER — IBUPROFEN 200 MG
1 TABLET ORAL
Qty: 15 | Refills: 0
Start: 2021-12-22 | End: 2021-12-26

## 2021-12-22 RX ORDER — ACETAMINOPHEN 500 MG
650 TABLET ORAL ONCE
Refills: 0 | Status: COMPLETED | OUTPATIENT
Start: 2021-12-22 | End: 2021-12-22

## 2021-12-22 RX ORDER — IBUPROFEN 200 MG
600 TABLET ORAL ONCE
Refills: 0 | Status: COMPLETED | OUTPATIENT
Start: 2021-12-22 | End: 2021-12-22

## 2021-12-22 RX ADMIN — Medication 650 MILLIGRAM(S): at 10:36

## 2021-12-22 RX ADMIN — Medication 600 MILLIGRAM(S): at 10:36
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
## 2021-12-22 NOTE — ED PROVIDER NOTE - NS ED ROS FT
Constitutional: (-) Fever, (-) Chills  Skin: (-) Color changes, (-) Rashes, (-) Wounds  CV: (-) Chest pain  Resp: (-) Cough, (-) Shortness of breath  GI: (-) Abdominal pain, (-) Nausea, (-) Vomiting, (-) Diarrhea  : (-) Dysuria   MSK: (+) Myalgias, (-) Calf pain  Neuro: (-) Headache

## 2021-12-22 NOTE — ED ADULT NURSE NOTE - OBJECTIVE STATEMENT
Pt arrives to the ER complaining of rt knee pain x days. pt medicated as per ordered. pt has full range of motion in rt leg, ambulates at baseline, skin intact, respirations even and unlabored. will continue to monitor.

## 2021-12-22 NOTE — ED PROVIDER NOTE - ATTENDING CONTRIBUTION TO CARE
I have personally performed a history and physical examination of the patient and discussed management with the ACP as well as the patient.  I reviewed the ACP's note and agree with the documented findings and plan of care.  I have authored and modified critical sections of the Provider Note, including but not limited to HPI, Physical Exam and MDM.    56y Female with PMHx of HLD, arthritis presents to the ER for knee pain. Patient reports worsening chronic right knee pain for the last few days. Likely arthritis vs patellar tendonitis. No evidence of loss of function or patellar disruption. Pt requesting duplex for fear of clot. Low suspicion for dvt vs superficial thrombophlebitis. Will obtain US and symptomatic control prn. Pt denies trauma, xrays less likely of diagnostic yield as fx unlikely at this time.

## 2021-12-22 NOTE — ED PROVIDER NOTE - NSFOLLOWUPINSTRUCTIONS_ED_ALL_ED_FT
Today you were seen in the ER for knee pain.     Please see below for a copy of your ultrasound results.     Take Motrin 600 mg every 8 hours as needed for moderate pain-- take with food..    Take Tylenol 650mg (Two 325 mg pills) every 4-6 hours as needed for pain.    Rest, Ice, Elevate injured area    Wear ace wrap as discussed.     Follow-up with Orthopedics, See referred doctor. Call today / next business day for close, prompt follow-up.    Return to Emergency room for any worsening or persistent pain, weakness, numbness, fever, color change to extremity, or any other concerning symptoms.    Advance activity as tolerated.     Continue all previously prescribed medications as directed unless otherwise instructed.     Follow up with your primary care physician in 48-72 hours- bring copies of your results.

## 2021-12-22 NOTE — ED PROVIDER NOTE - CLINICAL SUMMARY MEDICAL DECISION MAKING FREE TEXT BOX
56y Female with PMHx of HLD, arthritis presents to the ER for knee pain. Acute on chronic right knee pain worsening over the last few days. Denies fever, chills, redness, numbness, tingling or weakness. States '"I feel something moving in the front of my leg and I am concerned for a blood clot." Denies smoking, recent long car rides or plane rides, surgeries or hospitalizations. Vital signs stable. Anterior knee pain/swelling, no calf tenderness. Low suspicion for DVT vs fracture, likely MSK pain vs arthritis. Will give meds, US, reassess.

## 2021-12-22 NOTE — ED PROVIDER NOTE - PATIENT PORTAL LINK FT
You can access the FollowMyHealth Patient Portal offered by [facility] by registering at the following website: [email or web address]. By joining [employer]’s FollowMyHealth portal, you will also be able to view your health information using other applications (apps) compatible with our system.

## 2021-12-22 NOTE — ED ADULT TRIAGE NOTE - ACCOMPANIED BY
[address] [facility] [address] [place] [address]   Office [phone], Fax [phone]      Discharge Summary     Patient: Michelle Bruno       MRN: [id number]       YOB: 1963       Age: 63 y.o. Date of admission:  12/5/2018    Date of discharge:  12/20/2018    Primary care provider:  Ye Clarke MD     Admitting provider:  George Velazco MD    Discharging provider(s): Lissette Salas MD - Family Medicine Resident  Shey Adames MD - Family Medicine Attending     Consultations  · Cardiology  · Podiatry  · Vascular Surgery    Procedures  · ALBA 12/6  · R foot transmetatarsal amputation 12/7    Discharge destination: SNF. The patient is stable for discharge. Admission diagnosis  UTI (urinary tract infection)  Gangrene (HCC)    MEDICATION CHANGES:   STOP TAKING   - Metoprolol 25 mg BID  - HCTZ   START   - Norco every 4 hours as needed for pain   CONTINUE TAKING all other medications as prescribed      Final discharge diagnoses and brief hospital course: As per admitting provider: Christiano Watson is a 63 y.o. female with known PMH of DVT, PAD s/p fem-pop bypass, HTN, Hypercholesterolemia, DM, JANEL and CVA (2002, 2006 & 2010) who presents to the ER via EMS with complaints of low back pain after a ground level fall yesterday. States she fell out of her bed yesterday evening. Complaining of an aching, 7/10 midline; non-radiating, low back pain since fall. Pain is worsened with movement and relieved with rest she denies any bladder or bowel incontinence. Denies any fevers, chills, night sweats. Pt also endorses dysuria, increased urinary frequency and urgency over the last week. Denies any flank pain, hematuria, abdominal pain, suprapubic pain, nausea or vomitting. Of note, she seen in ED on on 11/29/18, diagnosed with a uti and discharged on Macrobid. Unable to confirm whether or not she obtained and continued her medications on ed discharge.    Pt also noted to have gangrene of great toe on right foot. Has been reluctant to accept amputation.     In the ER, vital signs were remarkable for BP of 190s/100s. Labs were remarkable for WBC of 12, K-2.7, Cr 1.79, glu-210, trop - 0.15, sed rate 56 and crp of 8.77. XR of right foot showed Small focal area of cortical discontinuity of the distal phalanx of the great Toe. UA showed Large blood, LEs, WBCs and 4+ Bacteria. Pt was treated with 1L NS Bolus, Zosyn and K repleted. \"      Conditions treated during this hospitalization:    Gangrenous ulcer of Rt 1st and 2nd Digits in Setting of PAD: s/p Right Fem-Pop bypass 3 months ago, but poor outpatient f/u. S/p R foot transmetatarsal amputation on 72/0/08 without complications. Per podiatry, flap still viable at this point, NWB RLE  - Continue Norco q6 PRN for pain     Hypertension: stable on Norvasc, Lisinopril, Clonidine, Isordil   -Continue Norvasc 10 mg daily and Lisinopril 40 mg daily, Clonidine 0.1 mg PO BID,  Isordil 20mg TID  - Stopped Metoprolol 25 mg BID due to bradycardia, restart if patient becomes tachycardic      Constipation   -Continue Colace 100 daily and Miralax 17g BID     Diabetes Mellitus T2 with Polyneuropathy and hyperglycemia:- A1C 8.2. Started on Lantus with SSI   - continue Lantus 28u daily, SSI  - POC glucose checks ACHS      Chronic DVT Left Posterior Tibial Vein:   - Xarelto 20mg daily     Iron Deficiency Anemia  - Ferrous Sulfate 325 mg BID   - Colace 100 mg daily      Hx of CVA, HLD: Stable  - Continue Lipitor 40mg      Rhabdomyolysis: Resolved. Likely 2/2 to necrotic R foot vs fall.     Acute Cystitis: Resolved. Treated with zosyn      Hypertensive Emergency:  Resolved. Continue HTN management as above.      At Risk for DIVYA on CKD stage 3:  Resolved. Baseline of 1.1-1.3. Labs/Imaging Needed on follow up: None    Pending test results: At the time of your discharge the following test results are still pending: None.    Please make sure you review these results with your outpatient follow-up provider(s). Specific symptoms to watch for: chest pain, shortness of breath, fever, chills, nausea, vomiting, diarrhea, change in mentation, falling, weakness, bleeding. DIET/what to eat:  Diabetic Diet    ACTIVITY:  Activity as tolerated    Wound care: Follow up with podiatry for R foot wound care    Equipment needed:  None    Follow-up Care: Follow-up Information     Follow up With Specialties Details Why Contact Info    NIKOLE [facility] Faustino Randy) [address] [id number] [phone] [id number]  [facility] Elicia   PT/OT, nursing after rehab Phone: [phone] [facility], R Viet He [address], MD [facility]   [address]  [phone]            Physical examination at discharge  Visit Vitals  /71 (BP 1 Location: Left arm, BP Patient Position: At rest)   Pulse 62   Temp 98.2 °F (36.8 °C)   Resp 17   Ht 5' 6\" (1.676 m)   Wt 198 lb 6.6 oz (90 kg)   SpO2 97%   BMI 32.02 kg/m²      Physical Examination:     General: Alert, oriented Cooperative.    Head: Normocephalic. Atraumatic. Eyes:             Conjunctiva pink. Sclera white. PERRL. Throat: Mucosa pink. Dry mucous membranes. Palate movement equal bilaterally. Neck: Supple. Normal ROM. No stiffness. Respiratory: CTAB. No w/r/r/c.   Cardiovascular: Bradycardic. Normal S1,S2. No m/r/g.    GI: + bowel sounds. Nontender. No rebound tenderness or guarding. Nondistended. Extremities: No edema. No palpable cord.  R Foot bandaged.     Musculoskeletal: Limited ROM in lower extremities. Normal ROM w/o tenderness. S/p TMA amputation of R foot   Neuro: CN II-XII grossly intact. Sensation intact in lower extremities. Skin: R groin hematoma 5-6cm size, stable.           Recent Results (from the past 24 hour(s))   GLUCOSE, POC    Collection Time: 12/19/18  9:29 PM   Result Value Ref Range    Glucose (POC) 147 (H) 65 - 100 mg/dL    Performed by Bolivar Quezada (PCT)    CBC WITH AUTOMATED DIFF    Collection Time: 12/20/18  3:29 AM   Result Value Ref Range    WBC 5.2 3.6 - 11.0 K/uL    RBC 3.70 (L) 3.80 - 5.20 M/uL    HGB 9.2 (L) 11.5 - 16.0 g/dL    HCT 31.4 (L) 35.0 - 47.0 %    MCV 84.9 80.0 - 99.0 FL    MCH 24.9 (L) 26.0 - 34.0 PG    MCHC 29.3 (L) 30.0 - 36.5 g/dL    RDW 15.5 (H) 11.5 - 14.5 %    PLATELET 928 (H) 867 - 400 K/uL    MPV 10.4 8.9 - 12.9 FL    NRBC 0.0 0  WBC    ABSOLUTE NRBC 0.00 0.00 - 0.01 K/uL    NEUTROPHILS 54 32 - 75 %    LYMPHOCYTES 29 12 - 49 %    MONOCYTES 11 5 - 13 %    EOSINOPHILS 5 0 - 7 %    BASOPHILS 1 0 - 1 %    IMMATURE GRANULOCYTES 0 0.0 - 0.5 %    ABS. NEUTROPHILS 2.8 1.8 - 8.0 K/UL    ABS. LYMPHOCYTES 1.5 0.8 - 3.5 K/UL    ABS. MONOCYTES 0.6 0.0 - 1.0 K/UL    ABS. EOSINOPHILS 0.3 0.0 - 0.4 K/UL    ABS. BASOPHILS 0.0 0.0 - 0.1 K/UL    ABS. IMM. GRANS. 0.0 0.00 - 0.04 K/UL    DF AUTOMATED     MAGNESIUM    Collection Time: 12/20/18  3:29 AM   Result Value Ref Range    Magnesium 1.7 1.6 - 2.4 mg/dL   METABOLIC PANEL, COMPREHENSIVE    Collection Time: 12/20/18  3:29 AM   Result Value Ref Range    Sodium 143 136 - 145 mmol/L    Potassium 3.9 3.5 - 5.1 mmol/L    Chloride 110 (H) 97 - 108 mmol/L    CO2 25 21 - 32 mmol/L    Anion gap 8 5 - 15 mmol/L    Glucose 84 65 - 100 mg/dL    BUN 17 6 - 20 MG/DL    Creatinine 1.12 (H) 0.55 - 1.02 MG/DL    BUN/Creatinine ratio 15 12 - 20      GFR est AA >60 >60 ml/min/1.73m2    GFR est non-AA 50 (L) >60 ml/min/1.73m2    Calcium 9.0 8.5 - 10.1 MG/DL    Bilirubin, total 0.2 0.2 - 1.0 MG/DL    ALT (SGPT) 13 12 - 78 U/L    AST (SGOT) 7 (L) 15 - 37 U/L    Alk.  phosphatase 63 45 - 117 U/L    Protein, total 6.6 6.4 - 8.2 g/dL    Albumin 2.6 (L) 3.5 - 5.0 g/dL    Globulin 4.0 2.0 - 4.0 g/dL    A-G Ratio 0.7 (L) 1.1 - 2.2     PHOSPHORUS    Collection Time: 12/20/18  3:29 AM   Result Value Ref Range    Phosphorus 3.9 2.6 - 4.7 MG/DL   GLUCOSE, POC    Collection Time: 12/20/18  7:15 AM   Result Value Ref Range    Glucose (POC) 112 (H) 65 - 100 mg/dL    Performed by Darrion Wesley (PCT)    GLUCOSE, POC    Collection Time: 12/20/18 11:38 AM   Result Value Ref Range    Glucose (POC) 127 (H) 65 - 100 mg/dL    Performed by Tyree Lofton (PCT)    GLUCOSE, POC    Collection Time: 12/20/18  4:20 PM   Result Value Ref Range    Glucose (POC) 157 (H) 65 - 100 mg/dL    Performed by Tyree Lofton (PCT)          Discharge Medication List as of 12/20/2018  3:35 PM      START taking these medications    Details   HYDROcodone-acetaminophen (NORCO) 5-325 mg per tablet Take 1 Tab by mouth every six (6) hours as needed. Max Daily Amount: 4 Tabs., Print, Disp-20 Tab, R-0      cloNIDine HCl (CATAPRES) 0.1 mg tablet Take 1 Tab by mouth two (2) times a day., No Print, Disp-60 Tab, R-2      ferrous sulfate 325 mg (65 mg iron) tablet Take 1 Tab by mouth two (2) times daily (with meals). , No Print, Disp-60 Tab, R-2         CONTINUE these medications which have CHANGED    Details   insulin glargine (LANTUS) 100 unit/mL injection 28 Units by SubCUTAneous route daily for 180 days. , No Print, Disp-1 Vial, R-1      isosorbide dinitrate (ISORDIL) 10 mg tablet Take 2 Tabs by mouth three (3) times daily. , No Print, Disp-90 Tab, R-1      atorvastatin (LIPITOR) 40 mg tablet Take 1 Tab by mouth nightly., No Print, Disp-60 Tab, R-2      docusate sodium (COLACE) 100 mg capsule Take 1 Cap by mouth daily for 90 days. , No Print, Disp-30 Cap, R-2         CONTINUE these medications which have NOT CHANGED    Details   rivaroxaban (XARELTO) 20 mg tab tablet Take 1 Tab by mouth daily. , No Print, Disp-30 Tab, R-1      lisinopril (PRINIVIL, ZESTRIL) 40 mg tablet Take 40 mg by mouth daily. , Historical Med      aspirin (ASPIRIN) 325 mg tablet Take 1 Tab by mouth daily. , Print, Disp-30 Tab, R-0      mupirocin (BACTROBAN) 2 % ointment Apply 22 g to affected area daily. , Normal, Disp-22 g, R-0      amLODIPine (NORVASC) 10 mg tablet Take 1 Tab by mouth daily. , Normal, Disp-30 Tab, R-1      oxybutynin (DITROPAN) 5 mg tablet TAKE 1 TABLET BY MOUTH TWICE DAILY, Normal, Disp-60 Tab, R-0         STOP taking these medications       metoprolol tartrate (LOPRESSOR) 25 mg tablet Comments:   Reason for Stopping:         hydroCHLOROthiazide (HYDRODIURIL) 25 mg tablet Comments:   Reason for Stopping:               Admission imaging studies:    Results from Hospital Encounter encounter on 12/05/18   XR CHEST PORT    Narrative PORTABLE CHEST RADIOGRAPH/S: 12/9/2018 8:10 AM    INDICATION: Fever. COMPARISON: 11/18/2018, 10/23/2018, 9/14/2018. TECHNIQUE: Portable frontal upright radiograph/s of the chest.    FINDINGS:   The lungs are clear. The central airways are patent. No pneumothorax or pleural  effusion. Impression IMPRESSION:   Clear lungs. Results from [place] encounter on 06/20/16   US HEAD NECK SOFT TISSUE    Narrative **Final Report**       ICD Codes / Adm. Diagnosis: 595.0  401.1 / Acute cystitis  Essential   hypertension, benign  Examination:  US HEAD NECK SOFT TISSUE  - [id number] - Jun 22 2016  2:20PM  Accession No:  [id number]  Reason:  left neck bump. REPORT:  US HEAD NECK SOFT TISSUE, 6/22/2016 2:20 PM  INDICATION: Acute cystitis Essential hypertension, benign    COMPARISON: None  . FINDINGS:  Limited sonographic evaluation of the left side of the lower neck was   performed to evaluate a palpable lump. Images demonstrate a lobular, well marginated isoechoic lesion measuring   approximately 6.3 cm in maximum dimension. .      IMPRESSION:    1. Findings suggest fatty tumor/lipoma. Signing/Reading Doctor: Razia Altman ([id number])    Darin Lopez ([id number])  Jun 22 2016  2:41PM                                          Results from Hospital Encounter encounter on 12/05/18   CT PELV WO CONT    Narrative CT PELVIS  WITHOUT CONTRAST. 12/5/2018 8:59 PM     INDICATION: Right groin mass.     COMPARISON: 9/4/2018, 5/27/2012. TECHNIQUE: CT of the pelvis was performed without contrast. CT dose reduction  was achieved through use of a standardized protocol tailored for this  examination and automatic exposure control for dose modulation. FINDINGS:  Reticular hyperdense fat stranding in the right groin likely represents a small  volume of hemorrhage or contusion. This causes swelling of the subcutaneous fat,  with skin distention. There is no mass. It extends to a partially imaged bypass  graft, which arises from the junction of the common and superficial femoral  arteries. There is a moderate volume of stool in the rectum. The visualized portions of  the unenhanced small bowel and colon are otherwise normal. The unenhanced  bladder is normal. No free air or fluid, and no pelvic lymphadenopathy. Impression IMPRESSION:  Small volume of hemorrhage and/or contusion in the right groin. No mass.                No procedure found.      -------------------------------------------------------------------------------------------------------------------    Chronic Diagnoses:    Problem List as of 12/20/2018 Date Reviewed: 12/8/2018          Codes Class Noted - Resolved    Rhabdomyolysis ICD-10-CM: M62.82  ICD-9-CM: 728.88  12/8/2018 - Present        Fall from ground level ICD-10-CM: Dre Saupe  ICD-9-CM: E888.9  12/8/2018 - Present        * (Principal) Gangrene ([facility] ICD-10-CM: J67  ICD-9-CM: 785.4  12/5/2018 - Present        Right groin mass ICD-10-CM: R19.09  ICD-9-CM: 789.39  12/5/2018 - Present        Elevated INR ICD-10-CM: R79.1  ICD-9-CM: 790.92  10/22/2018 - Present        PVD (peripheral vascular disease) ([facility] ICD-10-CM: I73.9  ICD-9-CM: 443.9  9/19/2018 - Present        Hypertensive urgency ICD-10-CM: I16.0  ICD-9-CM: 401.9  9/14/2018 - Present        Non-compliant patient (Chronic) ICD-10-CM: Z91.19  ICD-9-CM: V15.81  9/14/2018 - Present        Hypertensive emergency ICD-10-CM: I16.1  ICD-9-CM: 401.9  9/5/2018 - Present        HTN (hypertension) ICD-10-CM: I10  ICD-9-CM: 401.9  7/6/2018 - Present        Dysuria ICD-10-CM: R30.0  ICD-9-CM: 788.1  6/25/2018 - Present        Diabetic ulcer of right foot associated with type 2 diabetes mellitus ([facility] ICD-10-CM: E11.621, L97.519  ICD-9-CM: 250.80, 707.15  6/20/2018 - Present        CVA (cerebral vascular accident) [facility]) ICD-10-CM: I63.9  ICD-9-CM: 434.91  5/30/2018 - Present        Overactive bladder ICD-10-CM: N32.81  ICD-9-CM: 596.51  4/15/2018 - Present        Other hyperlipidemia ICD-10-CM: E78.49  ICD-9-CM: 272.4  4/15/2018 - Present        Prolonged Q-T interval on ECG ICD-10-CM: R94.31  ICD-9-CM: 794.31  3/11/2018 - Present        Cerebral atrophy ICD-10-CM: G31.9  ICD-9-CM: 331.9  12/5/2016 - Present    Overview Signed 12/5/2016  8:45 AM by Raffaele Alejo     MRI brain             Basilar artery stenosis ICD-10-CM: I65.1  ICD-9-CM: 433.00  12/5/2016 - Present    Overview Signed 12/5/2016  8:47 AM by Raffaele Alejo     MRA brain:  There is moderate stenosis in the mid basilar artery. Stenosis of left middle cerebral artery ICD-10-CM: I66.02  ICD-9-CM: 437.0  12/5/2016 - Present    Overview Signed 12/5/2016  8:48 AM by Raffaele Alejo     MRA brain:   Moderate stenosis in the proximal left M1.               Weakness of right lower extremity ICD-10-CM: R29.898  ICD-9-CM: 729.89  12/4/2016 - Present        Obesity, Class II, BMI 35-39.9 ICD-10-CM: E66.9  ICD-9-CM: 278.00  10/31/2014 - Present        Diabetic polyneuropathy ([facility] ICD-10-CM: E11.42  ICD-9-CM: 250.60, 357.2  9/5/2014 - Present        Cerebellar infarction with occlusion or stenosis of cerebellar artery (HCC) ICD-10-CM: C61.980  ICD-9-CM: 434.91  3/3/2014 - Present        Cerebral thrombosis with cerebral infarction [facility]) ICD-10-CM: I63.30  ICD-9-CM: 434.01  5/14/2011 - Present        Hypertension associated with diabetes ([facility] (Chronic) ICD-10-CM: E11.59, I10  ICD-9-CM: 250.80, 401.9 5/14/2011 - Present        Uncontrolled type 2 diabetes with renal manifestation [facility]) ICD-10-CM: E11.29, E11.65  ICD-9-CM: 250.42  4/15/2011 - Present        RESOLVED: UTI (urinary tract infection) ICD-10-CM: N39.0  ICD-9-CM: 599.0  9/5/2018 - 12/12/2018        RESOLVED: Wound of right lower extremity ICD-10-CM: S81.801A  ICD-9-CM: 891.0  5/1/2018 - 6/25/2018        RESOLVED: Elevated troponin ICD-10-CM: R74.8  ICD-9-CM: 790.6  4/15/2018 - 6/25/2018        RESOLVED: DIVYA (acute kidney injury) ([facility] ICD-10-CM: N17.9  ICD-9-CM: 584.9  4/15/2018 - 6/25/2018        RESOLVED: UTI (urinary tract infection) ICD-10-CM: N39.0  ICD-9-CM: 599.0  4/14/2018 - 6/25/2018        RESOLVED: Altered mental status ICD-10-CM: R41.82  ICD-9-CM: 780.97  4/14/2018 - 6/25/2018        RESOLVED: Hypoglycemia ICD-10-CM: E16.2  ICD-9-CM: 251.2  4/14/2018 - 6/25/2018        RESOLVED: TIA (transient ischemic attack) ICD-10-CM: G45.9  ICD-9-CM: 435.9  3/10/2018 - 6/25/2018        RESOLVED: Cerebellar stroke ([facility] ICD-10-CM: I63.9  ICD-9-CM: 434.91  12/7/2016 - 6/25/2018        RESOLVED: Diabetic hyperosmolar non-ketotic state ([facility] ICD-10-CM: E11.00  ICD-9-CM: 250.20  6/21/2016 - 6/25/2018        RESOLVED: UTI (urinary tract infection), uncomplicated GUT-00-PT: K80.9  ICD-9-CM: 599.0  6/21/2016 - 6/25/2018        RESOLVED: Hypertensive emergency ICD-10-CM: I16.1  ICD-9-CM: 401.9  6/20/2016 - 7/9/2018        RESOLVED: Cerebral infarction ([facility] ICD-10-CM: I63.9  ICD-9-CM: 434.91  4/15/2011 - 6/25/2018        RESOLVED: Hypertension ICD-10-CM: I10  ICD-9-CM: 401.9  4/7/2011 - 6/25/2018                Signed:      Carline Mckenna MD   Family Medicine Resident      12/20/2018     Minda Arnold MD   Family Medicine Attending Non family member

## 2021-12-22 NOTE — ED ADULT NURSE NOTE - PAIN: PRESENCE, MLM
Mother states she received a call from the school nurse stating her son's HR was almost 200.  Mother states her son was forced to run up and down stairs at school prior to.  Mother states that once home, her son came running up to her asking to be taken to the emergency room.    Pt denies any current chest pain.    APPEARANCE: Resting comfortably in no acute distress. Patient has clean hair, skin and nails. Clothing is appropriate and properly fastened.  NEURO: Awake, alert, appropriate for age, and cooperative with a calm affect; pupils equal and round.  HEENT: Head symmetrical. Bilateral eyes without redness or drainage. Bilateral ears without drainage. Bilateral nares patent without drainage.  CARDIAC:  S1 S2 auscultated.  No murmur, rub, or gallop auscultated.  RESPIRATORY:  Respirations even and unlabored with normal effort and rate.  Lungs clear throughout auscultation.  No accessory muscle use or retractions noted.  GI/: Abdomen soft and non-distended. Adequate bowel sounds auscultated with no tenderness noted on palpation in all four quadrants.    NEUROVASCULAR: All extremities are warm and pink with palpable pulses and capillary refill less than 3 seconds.  MUSCULOSKELETAL: Moves all extremities well; no obvious deformities noted.  SKIN: Warm and dry, adequate turgor, mucus membranes moist and pink; no breakdown.   SOCIAL: Patient is accompanied by parents.      
complains of pain/discomfort

## 2022-03-09 NOTE — ED ADULT NURSE NOTE - NS ED NURSE LEVEL OF CONSCIOUSNESS ORIENTATION
Oriented - self; Oriented - place; Oriented - time Click to Modify Medication Indication on Note Save

## 2023-02-08 ENCOUNTER — EMERGENCY (EMERGENCY)
Facility: HOSPITAL | Age: 58
LOS: 1 days | Discharge: HOME | End: 2023-02-08
Attending: EMERGENCY MEDICINE | Admitting: STUDENT IN AN ORGANIZED HEALTH CARE EDUCATION/TRAINING PROGRAM
Payer: MEDICAID

## 2023-02-08 VITALS
TEMPERATURE: 98 F | SYSTOLIC BLOOD PRESSURE: 151 MMHG | HEART RATE: 74 BPM | OXYGEN SATURATION: 99 % | DIASTOLIC BLOOD PRESSURE: 98 MMHG | RESPIRATION RATE: 16 BRPM

## 2023-02-08 LAB
ALBUMIN SERPL ELPH-MCNC: 4.6 G/DL — SIGNIFICANT CHANGE UP (ref 3.3–5)
ALP SERPL-CCNC: 69 U/L — SIGNIFICANT CHANGE UP (ref 40–120)
ALT FLD-CCNC: 21 U/L — SIGNIFICANT CHANGE UP (ref 4–33)
ANION GAP SERPL CALC-SCNC: 10 MMOL/L — SIGNIFICANT CHANGE UP (ref 7–14)
ANION GAP SERPL CALC-SCNC: 13 MMOL/L — SIGNIFICANT CHANGE UP (ref 7–14)
APPEARANCE UR: CLEAR — SIGNIFICANT CHANGE UP
AST SERPL-CCNC: 47 U/L — HIGH (ref 4–32)
BACTERIA # UR AUTO: ABNORMAL
BASOPHILS # BLD AUTO: 0.02 K/UL — SIGNIFICANT CHANGE UP (ref 0–0.2)
BASOPHILS NFR BLD AUTO: 0.5 % — SIGNIFICANT CHANGE UP (ref 0–2)
BILIRUB SERPL-MCNC: 0.9 MG/DL — SIGNIFICANT CHANGE UP (ref 0.2–1.2)
BILIRUB UR-MCNC: NEGATIVE — SIGNIFICANT CHANGE UP
BUN SERPL-MCNC: 7 MG/DL — SIGNIFICANT CHANGE UP (ref 7–23)
BUN SERPL-MCNC: 8 MG/DL — SIGNIFICANT CHANGE UP (ref 7–23)
CALCIUM SERPL-MCNC: 9 MG/DL — SIGNIFICANT CHANGE UP (ref 8.4–10.5)
CALCIUM SERPL-MCNC: 9.8 MG/DL — SIGNIFICANT CHANGE UP (ref 8.4–10.5)
CHLORIDE SERPL-SCNC: 103 MMOL/L — SIGNIFICANT CHANGE UP (ref 98–107)
CHLORIDE SERPL-SCNC: 98 MMOL/L — SIGNIFICANT CHANGE UP (ref 98–107)
CO2 SERPL-SCNC: 24 MMOL/L — SIGNIFICANT CHANGE UP (ref 22–31)
CO2 SERPL-SCNC: 25 MMOL/L — SIGNIFICANT CHANGE UP (ref 22–31)
COLOR SPEC: YELLOW — SIGNIFICANT CHANGE UP
COMMENT - URINE: SIGNIFICANT CHANGE UP
CREAT SERPL-MCNC: 0.64 MG/DL — SIGNIFICANT CHANGE UP (ref 0.5–1.3)
CREAT SERPL-MCNC: 0.67 MG/DL — SIGNIFICANT CHANGE UP (ref 0.5–1.3)
DIFF PNL FLD: NEGATIVE — SIGNIFICANT CHANGE UP
EGFR: 101 ML/MIN/1.73M2 — SIGNIFICANT CHANGE UP
EGFR: 102 ML/MIN/1.73M2 — SIGNIFICANT CHANGE UP
EOSINOPHIL # BLD AUTO: 0 K/UL — SIGNIFICANT CHANGE UP (ref 0–0.5)
EOSINOPHIL NFR BLD AUTO: 0 % — SIGNIFICANT CHANGE UP (ref 0–6)
EPI CELLS # UR: 3 /HPF — SIGNIFICANT CHANGE UP (ref 0–5)
GLUCOSE SERPL-MCNC: 180 MG/DL — HIGH (ref 70–99)
GLUCOSE SERPL-MCNC: 80 MG/DL — SIGNIFICANT CHANGE UP (ref 70–99)
GLUCOSE UR QL: NEGATIVE — SIGNIFICANT CHANGE UP
HCT VFR BLD CALC: 43.9 % — SIGNIFICANT CHANGE UP (ref 34.5–45)
HEMOLYSIS INDEX: 14 — SIGNIFICANT CHANGE UP
HGB BLD-MCNC: 14.2 G/DL — SIGNIFICANT CHANGE UP (ref 11.5–15.5)
HYALINE CASTS # UR AUTO: 2 /LPF — SIGNIFICANT CHANGE UP (ref 0–7)
IANC: 2.54 K/UL — SIGNIFICANT CHANGE UP (ref 1.8–7.4)
IMM GRANULOCYTES NFR BLD AUTO: 0.3 % — SIGNIFICANT CHANGE UP (ref 0–0.9)
KETONES UR-MCNC: ABNORMAL
LEUKOCYTE ESTERASE UR-ACNC: ABNORMAL
LYMPHOCYTES # BLD AUTO: 1.1 K/UL — SIGNIFICANT CHANGE UP (ref 1–3.3)
LYMPHOCYTES # BLD AUTO: 28.3 % — SIGNIFICANT CHANGE UP (ref 13–44)
MAGNESIUM SERPL-MCNC: 2 MG/DL — SIGNIFICANT CHANGE UP (ref 1.6–2.6)
MCHC RBC-ENTMCNC: 28.6 PG — SIGNIFICANT CHANGE UP (ref 27–34)
MCHC RBC-ENTMCNC: 32.3 GM/DL — SIGNIFICANT CHANGE UP (ref 32–36)
MCV RBC AUTO: 88.3 FL — SIGNIFICANT CHANGE UP (ref 80–100)
MONOCYTES # BLD AUTO: 0.22 K/UL — SIGNIFICANT CHANGE UP (ref 0–0.9)
MONOCYTES NFR BLD AUTO: 5.7 % — SIGNIFICANT CHANGE UP (ref 2–14)
NEUTROPHILS # BLD AUTO: 2.54 K/UL — SIGNIFICANT CHANGE UP (ref 1.8–7.4)
NEUTROPHILS NFR BLD AUTO: 65.2 % — SIGNIFICANT CHANGE UP (ref 43–77)
NITRITE UR-MCNC: NEGATIVE — SIGNIFICANT CHANGE UP
NRBC # BLD: 0 /100 WBCS — SIGNIFICANT CHANGE UP (ref 0–0)
NRBC # FLD: 0 K/UL — SIGNIFICANT CHANGE UP (ref 0–0)
PH UR: 6 — SIGNIFICANT CHANGE UP (ref 5–8)
PLATELET # BLD AUTO: 206 K/UL — SIGNIFICANT CHANGE UP (ref 150–400)
POTASSIUM SERPL-MCNC: 3.3 MMOL/L — LOW (ref 3.5–5.3)
POTASSIUM SERPL-MCNC: 3.3 MMOL/L — LOW (ref 3.5–5.3)
POTASSIUM SERPL-MCNC: 6 MMOL/L — HIGH (ref 3.5–5.3)
POTASSIUM SERPL-SCNC: 3.3 MMOL/L — LOW (ref 3.5–5.3)
POTASSIUM SERPL-SCNC: 3.3 MMOL/L — LOW (ref 3.5–5.3)
POTASSIUM SERPL-SCNC: 6 MMOL/L — HIGH (ref 3.5–5.3)
PROT SERPL-MCNC: 8.4 G/DL — HIGH (ref 6–8.3)
PROT UR-MCNC: ABNORMAL
RBC # BLD: 4.97 M/UL — SIGNIFICANT CHANGE UP (ref 3.8–5.2)
RBC # FLD: 14.1 % — SIGNIFICANT CHANGE UP (ref 10.3–14.5)
RBC CASTS # UR COMP ASSIST: 3 /HPF — SIGNIFICANT CHANGE UP (ref 0–4)
SARS-COV-2 RNA SPEC QL NAA+PROBE: SIGNIFICANT CHANGE UP
SODIUM SERPL-SCNC: 135 MMOL/L — SIGNIFICANT CHANGE UP (ref 135–145)
SODIUM SERPL-SCNC: 138 MMOL/L — SIGNIFICANT CHANGE UP (ref 135–145)
SP GR SPEC: 1.02 — SIGNIFICANT CHANGE UP (ref 1.01–1.05)
TROPONIN T, HIGH SENSITIVITY RESULT: 7 NG/L — SIGNIFICANT CHANGE UP
UROBILINOGEN FLD QL: SIGNIFICANT CHANGE UP
WBC # BLD: 3.89 K/UL — SIGNIFICANT CHANGE UP (ref 3.8–10.5)
WBC # FLD AUTO: 3.89 K/UL — SIGNIFICANT CHANGE UP (ref 3.8–10.5)
WBC UR QL: 25 /HPF — HIGH (ref 0–5)

## 2023-02-08 PROCEDURE — 99223 1ST HOSP IP/OBS HIGH 75: CPT

## 2023-02-08 PROCEDURE — 71046 X-RAY EXAM CHEST 2 VIEWS: CPT | Mod: 26

## 2023-02-08 RX ORDER — SODIUM CHLORIDE 9 MG/ML
1000 INJECTION, SOLUTION INTRAVENOUS
Refills: 0 | Status: DISCONTINUED | OUTPATIENT
Start: 2023-02-08 | End: 2023-02-08

## 2023-02-08 RX ORDER — ONDANSETRON 8 MG/1
4 TABLET, FILM COATED ORAL EVERY 8 HOURS
Refills: 0 | Status: DISCONTINUED | OUTPATIENT
Start: 2023-02-08 | End: 2023-02-11

## 2023-02-08 RX ORDER — ONDANSETRON 8 MG/1
4 TABLET, FILM COATED ORAL ONCE
Refills: 0 | Status: COMPLETED | OUTPATIENT
Start: 2023-02-08 | End: 2023-02-08

## 2023-02-08 RX ORDER — SUCRALFATE 1 G
1 TABLET ORAL
Refills: 0 | Status: DISCONTINUED | OUTPATIENT
Start: 2023-02-08 | End: 2023-02-09

## 2023-02-08 RX ORDER — PANTOPRAZOLE SODIUM 20 MG/1
40 TABLET, DELAYED RELEASE ORAL DAILY
Refills: 0 | Status: DISCONTINUED | OUTPATIENT
Start: 2023-02-08 | End: 2023-02-09

## 2023-02-08 RX ORDER — CEFTRIAXONE 500 MG/1
1000 INJECTION, POWDER, FOR SOLUTION INTRAMUSCULAR; INTRAVENOUS ONCE
Refills: 0 | Status: COMPLETED | OUTPATIENT
Start: 2023-02-08 | End: 2023-02-08

## 2023-02-08 RX ORDER — FAMOTIDINE 10 MG/ML
20 INJECTION INTRAVENOUS ONCE
Refills: 0 | Status: COMPLETED | OUTPATIENT
Start: 2023-02-08 | End: 2023-02-08

## 2023-02-08 RX ORDER — POTASSIUM CHLORIDE 20 MEQ
40 PACKET (EA) ORAL ONCE
Refills: 0 | Status: COMPLETED | OUTPATIENT
Start: 2023-02-08 | End: 2023-02-08

## 2023-02-08 RX ORDER — SODIUM CHLORIDE 9 MG/ML
1000 INJECTION INTRAMUSCULAR; INTRAVENOUS; SUBCUTANEOUS ONCE
Refills: 0 | Status: COMPLETED | OUTPATIENT
Start: 2023-02-08 | End: 2023-02-08

## 2023-02-08 RX ORDER — CEFTRIAXONE 500 MG/1
1000 INJECTION, POWDER, FOR SOLUTION INTRAMUSCULAR; INTRAVENOUS EVERY 24 HOURS
Refills: 0 | Status: DISCONTINUED | OUTPATIENT
Start: 2023-02-09 | End: 2023-02-11

## 2023-02-08 RX ORDER — FAMOTIDINE 10 MG/ML
20 INJECTION INTRAVENOUS EVERY 12 HOURS
Refills: 0 | Status: DISCONTINUED | OUTPATIENT
Start: 2023-02-08 | End: 2023-02-11

## 2023-02-08 RX ORDER — POTASSIUM CHLORIDE 20 MEQ
10 PACKET (EA) ORAL ONCE
Refills: 0 | Status: DISCONTINUED | OUTPATIENT
Start: 2023-02-08 | End: 2023-02-08

## 2023-02-08 RX ORDER — SODIUM CHLORIDE 9 MG/ML
1000 INJECTION, SOLUTION INTRAVENOUS
Refills: 0 | Status: DISCONTINUED | OUTPATIENT
Start: 2023-02-08 | End: 2023-02-11

## 2023-02-08 RX ADMIN — SODIUM CHLORIDE 75 MILLILITER(S): 9 INJECTION, SOLUTION INTRAVENOUS at 22:30

## 2023-02-08 RX ADMIN — ONDANSETRON 4 MILLIGRAM(S): 8 TABLET, FILM COATED ORAL at 20:04

## 2023-02-08 RX ADMIN — SODIUM CHLORIDE 200 MILLILITER(S): 9 INJECTION, SOLUTION INTRAVENOUS at 13:04

## 2023-02-08 RX ADMIN — FAMOTIDINE 20 MILLIGRAM(S): 10 INJECTION INTRAVENOUS at 12:51

## 2023-02-08 RX ADMIN — Medication 30 MILLILITER(S): at 12:51

## 2023-02-08 RX ADMIN — SODIUM CHLORIDE 1000 MILLILITER(S): 9 INJECTION INTRAMUSCULAR; INTRAVENOUS; SUBCUTANEOUS at 20:04

## 2023-02-08 RX ADMIN — Medication 30 MILLILITER(S): at 21:09

## 2023-02-08 RX ADMIN — CEFTRIAXONE 100 MILLIGRAM(S): 500 INJECTION, POWDER, FOR SOLUTION INTRAMUSCULAR; INTRAVENOUS at 14:59

## 2023-02-08 RX ADMIN — FAMOTIDINE 20 MILLIGRAM(S): 10 INJECTION INTRAVENOUS at 20:04

## 2023-02-08 RX ADMIN — Medication 40 MILLIEQUIVALENT(S): at 22:01

## 2023-02-08 NOTE — ED ADULT NURSE REASSESSMENT NOTE - NS ED NURSE REASSESS COMMENT FT1
Break coverage RN: report received from ANASTACIA Barney. Pt is a&ox3, breathing spontaneously and nonlabored, no acute distress in appearance. Pt denies presence of any pain at this time, only complaint is that she is still dizzy. Pt receiving IV fluids via patent IV in R AC. Safety precautions maintained at all times, pt aware to let RN know when she has to go to the bathroom. Will continue to monitor.

## 2023-02-08 NOTE — ED PROVIDER NOTE - PROGRESS NOTE DETAILS
Evens, Med Tox Fellow: pt feeling slightly better after meds, UA showing s/o UTI and ketones otherwise labs reassuring besides hemolyzed K (pts EKG not showing hyperk stigmata, pts K from last night at [facility] was 3.9) will repeat BMP. Pt got dose of ceftriaxone last night, U cultures aren't back though spoke with CDU PA to try to f/u the cultures on pts [facility] portal. Pt with some nonspecific T wave changes on EKG, trending trops.  Plan for CDU:  Treat UTI/try to obtain Ucultures from [facility] done on 2/7  Treat starvation ketosis, give GI cocktail/PO challenge  Trend trop/keep pt on cardiac monitor

## 2023-02-08 NOTE — ED CDU PROVIDER INITIAL DAY NOTE - CLINICAL SUMMARY MEDICAL DECISION MAKING FREE TEXT BOX
Zambratto, Med Tox Fellow: likely pt with sxs from uti/starvation ketosis based on her results from [facility] last night that pt has with her. Low suspicion intracranial/cardiac in nature. Will do ekg, labs, urine, D5 fluids, meds. Consider CDU.    Dehydration/starvation ketosis, GERD-labs, fluids, medication, advised to follow up with GI.

## 2023-02-08 NOTE — ED CDU PROVIDER INITIAL DAY NOTE - ATTENDING APP SHARED VISIT CONTRIBUTION OF CARE
CDU MD SURESH:  I performed a face to face bedside interview with patient regarding history of present illness, review of symptoms and past medical history. I completed an independent physical exam.  I have discussed patient's plan of care with PA.   I agree with note as stated above, having amended the EMR as needed to reflect my findings. I have discussed the assessment and plan of care.  This includes during the time I functioned as the attending physician for this patient.    56 y/o female with diff cecile po.  CDU for ivf antiemetics abx for uti.

## 2023-02-08 NOTE — ED PROVIDER NOTE - PHYSICAL EXAMINATION
GENERAL: no acute distress, non-toxic appearing  HEENT: normal conjunctiva, oral mucosa moist  CARDIAC: regular rate and regular rhythm, bp reassuring  PULM: clear to ascultation bilaterallyno incr wob  GI: abdomen nondistended, soft, nontender  : no CVA tenderness, no suprapubic tenderness  NEURO: alert and oriented x 3, normal speech, moving all extremities without lateralization  MSK: no visible deformities, no peripheral edema, calf tenderness/redness/swelling  SKIN: no visible rashes  PSYCH: appropriate mood and affect

## 2023-02-08 NOTE — ED CDU PROVIDER INITIAL DAY NOTE - OBJECTIVE STATEMENT
57F PMH GERD (on omeprazole), presents to the ED with heart racing palpitations and lightheadedness for the past few days. Pt states that she has GERD and feels like it's worse than usual over the past month and she hasn't been eating much because of it and the omeprazole doesn't seem to be working. Pt went to [facility] ED last night and had CT chest/abd/pelvis done without acute findings, pts labwork/trop was reassuring besides blood glucose in the 60s. Pts UA showed WBC and esterase  as well as ketones and pt was given dose of ceftriaxone and abx sent to pharmacy. Pt states that last night at 8pm pt got haloperidol and she feels like her lightheadedness/palpitations got worse. Reportedly she was offered admission but left. Denies fevers/chills, headaches/vision changes, uri sxs, cough, chest pain/sob, v/d, dark/bloody stools, urinary sxs, rash.    CHAYO Luo: This is a 57-year-old female with a history of GERD on omeprazole, hyperlipidemia not on any medication presented to the ED complaining having a burning sensation in her stomach along with reflux and unable to tolerate p.o.  States that over the last few weeks she says she has a regurgitation sensation every time she eats she feels like it is vomiting up or coming into her stomach.  She states that she is not able to take her GERD medication because of this.  She went to the hospital yesterday at [place] where they did blood work and a CAT scan of her abdomen which was within normal limits.  She was also found to have a urinary tract infection which she was started on antibiotics however did not take any antibiotics at home.  She does not have any fever chills body aches chest pain exertional dyspnea dizziness dysuria hematuria urinary urgency or frequency. she has not been eating or drinking as she normally does.

## 2023-02-08 NOTE — ED PROVIDER NOTE - ATTENDING CONTRIBUTION TO CARE
DR. SURESH, ATTENDING MD-  I performed a face to face bedside interview with the patient regarding history of present illness, review of symptoms and past medical history. I completed an independent physical exam.  I have discussed the patient's plan of care with the fellow.  Documentation as above in the note.    58 y/o female h/o hld with dizziness palpitations in setting of recent uti dx.  Eval for anemia lyte abn uti.  Obtain cbc cmp trop ekg cxr ua ucx give ivf bolus reassess.

## 2023-02-08 NOTE — ED PROVIDER NOTE - CLINICAL SUMMARY MEDICAL DECISION MAKING FREE TEXT BOX
Zambratto, Med Tox Fellow: likely pt with sxs from uti/starvation ketosis based on her results from [facility] last night that pt has with her. Low suspicion intracranial/cardiac in nature. Will do ekg, labs, urine, D5 fluids, meds. Consider CDU

## 2023-02-08 NOTE — ED ADULT TRIAGE NOTE - CHIEF COMPLAINT QUOTE
Pt h/o HLD, presents for dizziness, palpitations, generalized not feeling well, nausea for the past several days.

## 2023-02-09 VITALS
TEMPERATURE: 98 F | HEART RATE: 70 BPM | SYSTOLIC BLOOD PRESSURE: 139 MMHG | OXYGEN SATURATION: 98 % | DIASTOLIC BLOOD PRESSURE: 90 MMHG | RESPIRATION RATE: 19 BRPM

## 2023-02-09 LAB
ANION GAP SERPL CALC-SCNC: 10 MMOL/L — SIGNIFICANT CHANGE UP (ref 7–14)
BUN SERPL-MCNC: 7 MG/DL — SIGNIFICANT CHANGE UP (ref 7–23)
CALCIUM SERPL-MCNC: 9.2 MG/DL — SIGNIFICANT CHANGE UP (ref 8.4–10.5)
CHLORIDE SERPL-SCNC: 105 MMOL/L — SIGNIFICANT CHANGE UP (ref 98–107)
CO2 SERPL-SCNC: 27 MMOL/L — SIGNIFICANT CHANGE UP (ref 22–31)
CREAT SERPL-MCNC: 0.85 MG/DL — SIGNIFICANT CHANGE UP (ref 0.5–1.3)
CULTURE RESULTS: SIGNIFICANT CHANGE UP
EGFR: 79 ML/MIN/1.73M2 — SIGNIFICANT CHANGE UP
GLUCOSE SERPL-MCNC: 109 MG/DL — HIGH (ref 70–99)
MAGNESIUM SERPL-MCNC: 2 MG/DL — SIGNIFICANT CHANGE UP (ref 1.6–2.6)
PHOSPHATE SERPL-MCNC: 2.8 MG/DL — SIGNIFICANT CHANGE UP (ref 2.5–4.5)
POTASSIUM SERPL-MCNC: 4.7 MMOL/L — SIGNIFICANT CHANGE UP (ref 3.5–5.3)
POTASSIUM SERPL-SCNC: 4.7 MMOL/L — SIGNIFICANT CHANGE UP (ref 3.5–5.3)
SODIUM SERPL-SCNC: 142 MMOL/L — SIGNIFICANT CHANGE UP (ref 135–145)
SPECIMEN SOURCE: SIGNIFICANT CHANGE UP

## 2023-02-09 PROCEDURE — 99238 HOSP IP/OBS DSCHRG MGMT 30/<: CPT

## 2023-02-09 RX ORDER — FAMOTIDINE 10 MG/ML
1 INJECTION INTRAVENOUS
Qty: 14 | Refills: 0
Start: 2023-02-09 | End: 2023-02-15

## 2023-02-09 RX ORDER — CEFDINIR 250 MG/5ML
1 POWDER, FOR SUSPENSION ORAL
Qty: 14 | Refills: 0
Start: 2023-02-09 | End: 2023-02-15

## 2023-02-09 RX ADMIN — FAMOTIDINE 20 MILLIGRAM(S): 10 INJECTION INTRAVENOUS at 09:56

## 2023-02-09 RX ADMIN — SODIUM CHLORIDE 75 MILLILITER(S): 9 INJECTION, SOLUTION INTRAVENOUS at 12:34

## 2023-02-09 RX ADMIN — CEFTRIAXONE 100 MILLIGRAM(S): 500 INJECTION, POWDER, FOR SOLUTION INTRAMUSCULAR; INTRAVENOUS at 14:23

## 2023-02-09 RX ADMIN — Medication 30 MILLILITER(S): at 12:34

## 2023-02-09 NOTE — ED CDU PROVIDER SUBSEQUENT DAY NOTE - NSICDXPASTMEDICALHX_GEN_ALL_CORE_FT
Physical Therapy  Cash Based Dry Needling Session    Visit: 9  Dry Needling Informed Consent Signed: Yes  Patient has been made aware of the cash based cost associated with each of the above procedures: Yes    SUBJECTIVE   See daily    OBJECTIVE    See daily    Treatment   Education about indications, contraindications and potential side effects completed with patient.  Screen Completed   Precautions Contraindications   Local skin lesions  Lyme disease  Local lymphedema  Severe hyperalgesia/allodynia  Metal allergies: nickel and chromium  Abnormal bleeding tendency  Immunodeficiency and/or compromised immune system  Second or third trimester of pregnancy  Vascular Disease  History of spontaneous pneumothorax Local or systemic infections; including the flu  Over implants  Active cancer  Area of lymphatic compromise  Area of lumpectomy/mastectomy  First trimester of pregnancy     The following potential risks and adverse effects were reviewed with the patient:  bleeding, broken/retained needle, bruising, fainting/dizziness, infection, internal organ injury, nerve injury, pain during and after treatment, pneumothorax resulting potentially in death, sweating, vasovagal response including cardiac arrest    Patient has consented to proceed with the intervention.    Dry Needling:   Needles inserted 6   Needles removed 6   Locations and Needle Size 40mm x2 to right upper trap, 40mm x3 to left upper trap, 60mm x2 to left lats and teres major.      Treatment duration 15 min   Patient response Deep ache and twitch with all needling locations       ASSESSMENT AND FUTURE RECOMMENDATIONS    Response to treatment: improved mobility post. Less soreness in upper traps  Based on the above recommendation is to: Continue Cash Based Service    CASH BASED THERAPY DAILY BILLING   Untimed Procedures:  Dry Needling - Unlisted Physical Medicine/Rehabilitation Service Or Procedure  Total time spent with patient: 15 minutes     PAST MEDICAL HISTORY:  HLD (hyperlipidemia)

## 2023-02-09 NOTE — ED CDU PROVIDER DISPOSITION NOTE - PATIENT PORTAL LINK FT
You can access the FollowMyHealth Patient Portal offered by [facility] by registering at the following website: [email or web address]. By joining [employer]’s FollowMyHealth portal, you will also be able to view your health information using other applications (apps) compatible with our system.

## 2023-02-09 NOTE — ED CDU PROVIDER DISPOSITION NOTE - ATTENDING CONTRIBUTION TO CARE
I have personally performed a face to face medical and diagnostic evaluation of the patient. I have discussed with and reviewed the SHANNAN's note and agree with the History, ROS, Physical Exam and MDM unless otherwise indicated. A brief summary of my personal evaluation and impression can be found below.    Please see CDU Subsequent day note for further details.

## 2023-02-09 NOTE — ED CDU PROVIDER DISPOSITION NOTE - NSFOLLOWUPINSTRUCTIONS_ED_ALL_ED_FT
Take cefdinir as prescribed for UTI.   Take Pepcid 20 mg once a day as needed for indigestion or heartburn.  Take Maalox, which is available over the counter -- follow instructions on label.  Follow up with gastroenterologist within 1-2 weeks.  Advance activity as tolerated.  Continue all previously prescribed medications as directed unless otherwise instructed.  Follow up with your primary care physician in 48-72 hours- bring copies of your results.  Return to the ER for worsening or persistent symptoms, and/or ANY NEW OR CONCERNING SYMPTOMS. If you have issues obtaining follow up, please call: [phone] to obtain a doctor or specialist who takes your insurance in your area.  You may call [phone] to make an appointment with the internal medicine clinic.

## 2023-02-09 NOTE — ED ADULT NURSE NOTE - CHIEF COMPLAINT
The patient is a 57y Female complaining of 
p/w Hb 3.6, Microcytic Hypochromic anemia  Pt reports bleeding from below but unsure if its hematuria or vaginal bleed  Vitals are stable  CT A/P without contrast shows (i) Indeterminate right renal mass concerning for Renal Cell carcinoma   (ii) Fibroid Uterus  Will transfuse 2 PRBC  Obgyn recommended US Pelvis and did not notice active vaginal bleed on Speculum examination  f/u Urinalysis  f/u Anemia Panel  f/u Post transfusion CBC  f/u CBC q8 hrs  Heme/Onc consult

## 2023-02-09 NOTE — ED CDU PROVIDER SUBSEQUENT DAY NOTE - CLINICAL SUMMARY MEDICAL DECISION MAKING FREE TEXT BOX
57-year-old female with a history of GERD on omeprazole, hyperlipidemia not on any medication presented to the ED complaining having a burning sensation in her stomach along with reflux and unable to tolerate p.o.  States that over the last few weeks she says she has a regurgitation sensation every time she eats she feels like it is vomiting up or coming into her stomach.  She states that she is not able to take her GERD medication because of this.  She went to the hospital yesterday at [place] where they did blood work and a CAT scan of her abdomen which was within normal limits.  She was also found to have a urinary tract infection which she was started on antibiotics however did not take any antibiotics at home.  She does not have any fever chills body aches chest pain exertional dyspnea dizziness dysuria hematuria urinary urgency or frequency. she has not been eating or drinking as she normally does.  Pt tolerating PO overnight. Potassium repleted. Repeat Bmp this morning. If pt continues to tolerate PO and labs normal, plan to d/c with GI cocktail, GI follow up, continue treatment for UTI.

## 2023-02-09 NOTE — ED CDU PROVIDER SUBSEQUENT DAY NOTE - PROGRESS NOTE DETAILS
on re-evaluation, pt reports no dizziness at this time. although still feeling mild generalized weakness, pt feels more energetic than yesterday. pt had breakfast and feels fine with no complaints at this time. cefdinir and pepcid sent to pharmacy.

## 2023-02-09 NOTE — ED CDU PROVIDER DISPOSITION NOTE - CLINICAL COURSE
57-year-old female with a history of GERD on omeprazole, hyperlipidemia not on any medication presented to the ED complaining having a burning sensation in her stomach along with reflux and unable to tolerate p.o.  States that over the last few weeks she says she has a regurgitation sensation every time she eats she feels like it is vomiting up or coming into her stomach.  She states that she is not able to take her GERD medication because of this.  She went to the hospital yesterday at [place] where they did blood work and a CAT scan of her abdomen which was within normal limits.  She was also found to have a urinary tract infection which she was started on antibiotics however did not take any antibiotics at home.  She does not have any fever chills body aches chest pain exertional dyspnea dizziness dysuria hematuria urinary urgency or frequency. she has not been eating or drinking as she normally does.  Pt tolerating PO overnight. Potassium repleted. Repeat Bmp this morning.   on re-evaluation, pt reports no dizziness at this time. although still feeling mild generalized weakness, pt feels more energetic than yesterday. pt had breakfast and feels fine with no complaints at this time. repeat BMP normal. VSS. cefdinir and pepcid sent to pharmacy.

## 2023-02-09 NOTE — ED CDU PROVIDER SUBSEQUENT DAY NOTE - HISTORY
57-year-old female with a history of GERD on omeprazole, hyperlipidemia not on any medication presented to the ED complaining having a burning sensation in her stomach along with reflux and unable to tolerate p.o.  States that over the last few weeks she says she has a regurgitation sensation every time she eats she feels like it is vomiting up or coming into her stomach.  She states that she is not able to take her GERD medication because of this.  She went to the hospital yesterday at [place] where they did blood work and a CAT scan of her abdomen which was within normal limits.  She was also found to have a urinary tract infection which she was started on antibiotics however did not take any antibiotics at home.  She does not have any fever chills body aches chest pain exertional dyspnea dizziness dysuria hematuria urinary urgency or frequency. she has not been eating or drinking as she normally does.  Pt tolerating PO overnight. Potassium repleted. Repeat Bmp this morning. If pt continues to tolerate PO and labs normal, plan to d/c with GI cocktail, GI follow up, continue treatment for UTI.

## 2023-02-09 NOTE — ED CDU PROVIDER SUBSEQUENT DAY NOTE - ATTENDING APP SHARED VISIT CONTRIBUTION OF CARE
I have personally performed a face to face medical and diagnostic evaluation of the patient. I have discussed with and reviewed the SHANNAN's note and agree with the History, ROS, Physical Exam and MDM unless otherwise indicated. A brief summary of my personal evaluation and impression can be found below.    Kareen HOLLIDAY: 57-year-old female with a history of GERD on omeprazole, hyperlipidemia not on any medication presented to the ED complaining having a burning sensation in her stomach along with reflux and unable to tolerate p.o.  States that over the last few weeks she says she has a regurgitation sensation every time she eats she feels like it is vomiting up or coming into her stomach.  She states that she is not able to take her GERD medication because of this.  She went to the hospital yesterday at [place] where they did blood work and a CAT scan of her abdomen which was within normal limits.  She was also found to have a urinary tract infection which she was started on antibiotics however did not take any antibiotics at home.  She does not have any fever chills body aches chest pain exertional dyspnea dizziness dysuria hematuria urinary urgency or frequency. she has not been eating or drinking as she normally does.  Pt tolerating PO overnight. Potassium repleted. Repeat Bmp this morning. If pt continues to tolerate PO and labs normal, plan to d/c with GI cocktail, GI follow up, continue treatment for UTI.    No acute events overnight, patient reports is able to tolerate p.o. this morning, though in very small amounts, though still feels like she is sensation of nausea.  No fevers overnight.      No acute events overnight, patient reports she still has intermittent episodes of lightheadedness and dizziness, however none at this time of assessment during morning rounds, patient reports he is also concerned that no one contrast the possible swelling to her bilateral lower extremities, labs and imaging reviewed, patient reports no prior history of DVT or PE, can move everything and feel everything.  No chest pain or shortness of breath no nausea no vomiting.    All other ROS negative, except as above and as per HPI and ROS section.    VITALS: Initial triage and subsequent vitals have been reviewed by me.  GEN APPEARANCE: WDWN, alert, non-toxic, NAD  HEAD: Atraumatic.  EYES: PERRLa, EOMI, vision grossly intact.   NECK: Supple  CV: RRR, S1S2, no c/r/m/g. Cap refill <2 seconds. No bruits.   LUNGS: CTAB. No abnormal breath sounds.  ABDOMEN: Soft, NTND. No guarding or rebound.   MSK/EXT: No spinal or extremity point tenderness. No CVA ttp. Pelvis stable. No peripheral edema.  NEURO: Alert, follows commands. Weight bearing normal. Speech normal. Sensation and motor normal x4 extremities.   SKIN: Warm, dry and intact. No rash.  PSYCH: Appropriate    Plan/MDM:  57-year-old female with a history of GERD on omeprazole, hyperlipidemia not on any medication presented to the ED complaining having a burning sensation in her stomach along with reflux and unable to tolerate p.o.  States that over the last few weeks she says she has a regurgitation sensation every time she eats she feels like it is vomiting up or coming into her stomach.  She states that she is not able to take her GERD medication because of this.  She went to the hospital yesterday at [place] where they did blood work and a CAT scan of her abdomen which was within normal limits.  She was also found to have a urinary tract infection which she was started on antibiotics however did not take any antibiotics at home.  She does not have any fever chills body aches chest pain exertional dyspnea dizziness dysuria hematuria urinary urgency or frequency. she has not been eating or drinking as she normally does.  Pt tolerating PO overnight. Potassium repleted. Repeat Bmp this morning. If pt continues to tolerate PO and labs normal, plan to d/c with GI cocktail, GI follow up, continue treatment for UTI. Exam vital signs stable nontoxic-appearing with physical exam as above, hypokalemia resolved after repletion, patient still nauseous still tolerating p.o., Her abdomen is soft nontender, will reassess in the afternoon after final round of IV antibiotics, patient reports she still feels well will likely discharge with PMD follow-up strict return precautions.

## 2023-05-09 ENCOUNTER — APPOINTMENT (OUTPATIENT)
Dept: GASTROENTEROLOGY | Facility: CLINIC | Age: 58
End: 2023-05-09
Payer: MEDICAID

## 2023-05-09 VITALS
DIASTOLIC BLOOD PRESSURE: 93 MMHG | SYSTOLIC BLOOD PRESSURE: 154 MMHG | HEART RATE: 72 BPM | HEIGHT: 63 IN | WEIGHT: 167 LBS | BODY MASS INDEX: 29.59 KG/M2

## 2023-05-09 DIAGNOSIS — I10 ESSENTIAL (PRIMARY) HYPERTENSION: ICD-10-CM

## 2023-05-09 DIAGNOSIS — R10.13 EPIGASTRIC PAIN: ICD-10-CM

## 2023-05-09 DIAGNOSIS — E78.00 PURE HYPERCHOLESTEROLEMIA, UNSPECIFIED: ICD-10-CM

## 2023-05-09 DIAGNOSIS — R14.2 ERUCTATION: ICD-10-CM

## 2023-05-09 DIAGNOSIS — M17.12 UNILATERAL PRIMARY OSTEOARTHRITIS, LEFT KNEE: ICD-10-CM

## 2023-05-09 DIAGNOSIS — M17.11 UNILATERAL PRIMARY OSTEOARTHRITIS, RIGHT KNEE: ICD-10-CM

## 2023-05-09 DIAGNOSIS — K21.9 GASTRO-ESOPHAGEAL REFLUX DISEASE W/OUT ESOPHAGITIS: ICD-10-CM

## 2023-05-09 PROCEDURE — 99214 OFFICE O/P EST MOD 30 MIN: CPT | Mod: 25

## 2023-05-09 PROCEDURE — 82274 ASSAY TEST FOR BLOOD FECAL: CPT | Mod: QW

## 2023-05-09 RX ORDER — SIMVASTATIN 20 MG/1
20 TABLET, FILM COATED ORAL
Refills: 0 | Status: ACTIVE | COMMUNITY

## 2023-05-09 RX ORDER — ASCORBIC ACID 500 MG
TABLET ORAL
Refills: 0 | Status: DISCONTINUED | COMMUNITY
End: 2023-05-09

## 2023-05-09 RX ORDER — FAMOTIDINE 20 MG/1
20 TABLET, FILM COATED ORAL
Qty: 180 | Refills: 3 | Status: ACTIVE | COMMUNITY
Start: 2023-05-09 | End: 1900-01-01

## 2023-05-09 RX ORDER — FAMOTIDINE 20 MG/1
20 TABLET ORAL
Refills: 0 | Status: ACTIVE | COMMUNITY

## 2023-05-09 RX ORDER — CHOLECALCIFEROL (VITAMIN D3) 25 MCG
TABLET ORAL
Refills: 0 | Status: ACTIVE | COMMUNITY

## 2023-05-09 RX ORDER — SCOPOLAMINE 1.5 MG/1
1 PATCH, EXTENDED RELEASE TRANSDERMAL
Qty: 1 | Refills: 0 | Status: DISCONTINUED | COMMUNITY
Start: 2020-10-15 | End: 2023-05-09

## 2023-05-09 NOTE — PHYSICAL EXAM
[Alert] : alert [Normal Voice/Communication] : normal voice/communication [Healthy Appearing] : healthy appearing [No Acute Distress] : no acute distress [Well Nourished] : well nourished [Sclera] : the sclera and conjunctiva were normal [Hearing Threshold Finger Rub Not [place]] : hearing was normal [Normal Lips/Gums] : the lips and gums were normal [Oropharynx] : the oropharynx was normal [Normal Appearance] : the appearance of the neck was normal [No Neck Mass] : no neck mass was observed [No Respiratory Distress] : no respiratory distress [No Acc Muscle Use] : no accessory muscle use [Respiration, Rhythm And Depth] : normal respiratory rhythm and effort [Auscultation Breath Sounds / Voice Sounds] : lungs were clear to auscultation bilaterally [Heart Rate And Rhythm] : heart rate was normal and rhythm regular [Normal S1, S2] : normal S1 and S2 [Murmurs] : no murmurs [Heart Sounds Gallop] : no gallops [No Rubs] : no pericardial rub [None] : no edema [Bowel Sounds] : normal bowel sounds [Abdomen Tenderness] : non-tender [No Masses] : no abdominal mass palpated [Abdomen Soft] : soft [Ascites: ___] : no ascites [Rebound Tenderness] : no rebound tenderness [No Hernia] : no hernia [Normal Sphincter Tone] : normal sphincter tone [No External Hemorrhoid] : no external hemorrhoids [No Rectal Mass] : no rectal mass [Occult Blood] : negative occult blood [FIT Test] : negative FIT test [Cervical Lymph Nodes Enlarged Posterior Bilaterally] : no posterior cervical lymphadenopathy [Supraclavicular Lymph Nodes Enlarged Bilaterally] : no supraclavicular lymphadenopathy [Cervical Lymph Nodes Enlarged Anterior Bilaterally] : no anterior cervical lymphadenopathy [No CVA Tenderness] : no CVA  tenderness [No Spinal Tenderness] : no spinal tenderness [No Clubbing, Cyanosis] : no clubbing or cyanosis of the fingernails [Involuntary Movements] : no involuntary movements were seen [Normal Color / Pigmentation] : normal skin color and pigmentation [] : no rash [Normal Turgor] : normal skin turgor [Skin Lesions] : no skin lesions [No Focal Deficits] : no focal deficits [Oriented To Time, Place, And Person] : oriented to person, place, and time [Normal Affect] : the affect was normal [Normal Mood] : the mood was normal [de-identified] : Dentures [de-identified] : Brown stool

## 2023-05-09 NOTE — HISTORY OF PRESENT ILLNESS
[FreeTextEntry1] : Isabel was in the [facility] ER on February 8, 2023 because she was burping a lot and was unable to eat.  She had been taking Dexilant for esophagitis and gastritis, and although this helped her in the past, it did not help her recently.  Before she went to [facility] with the recent GI symptoms, she was seen at the [place] ER, and was discharged from there.  At [facility], she was given intravenous famotidine and discharged on oral famotidine.  Since discharge, she feels at least 50% better.  She denies change in bowel habits, significant abdominal pain or blood in the stool.  Right now, she does not complain of heartburn.  She needs a refill of famotidine.

## 2023-05-09 NOTE — REVIEW OF SYSTEMS
[Joint Stiffness] : joint stiffness [Difficulty Walking] : difficulty walking [Negative] : Heme/Lymph

## 2023-05-09 NOTE — CONSULT LETTER
[Dear  ___] : Dear  [unfilled], [( Thank you for referring [unfilled] for consultation for _____ )] : Thank you for referring [unfilled] for consultation for [unfilled] [Please see my note below.] : Please see my note below. [Consult Closing:] : Thank you very much for allowing me to participate in the care of this patient.  If you have any questions, please do not hesitate to contact me. [Sincerely,] : Sincerely, [FreeTextEntry3] : Facundo Jalloh MD

## 2023-05-09 NOTE — ASSESSMENT
[FreeTextEntry1] : Assessment:\par 1. GERD-upper endoscopy November 4, 2016 revealed esophagitis and gastritis-recent increase in burping may be secondary to gastroesophageal reflux.\par 2. Sialorrhea-rule out stone in a salivary gland; rule out drug-induced-resolved.\par 3. Colonoscopy November 4, 2016 revealed internal hemorrhoids.\par 4. Hypertension.\par 5. Hypercholesterolemia.\par 6. Status post LOUIE without oophorectomy.\par \par Plan:\par 1.  Since patient has been obtaining significant relief with famotidine 20 mg twice daily, I renewed this prescription for her.  Should she develop increasing reflux symptoms, would consider repeating an upper endoscopy and possibly switching to a PPI once again.\par 2.  I discussed the option of having a colonoscopy with the patient at this time, but but she would prefer waiting for 10 years after her last colonoscopy, which would be in 2026.\par 3.  Hospital records were reviewed.

## 2024-05-02 NOTE — ED PROVIDER NOTE - WR ORDER NAME 1
Procedure   - Large Joint Arthrocentesis: bilateral hip joint on 5/2/2024 11:20 AM  Indications: pain  Details: 21 G needle, lateral approach  Medications (Right): 4 mL lidocaine PF 1% 1 %; 40 mg triamcinolone acetonide 40 MG/ML  Medications (Left): 4 mL lidocaine PF 1% 1 %; 40 mg triamcinolone acetonide 40 MG/ML  Outcome: tolerated well, no immediate complications  Procedure, treatment alternatives, risks and benefits explained, specific risks discussed. Consent was given by the patient. Immediately prior to procedure a time out was called to verify the correct patient, procedure, equipment, support staff and site/side marked as required. Patient was prepped and draped in the usual sterile fashion.         Xray Chest 2 Views PA/Lat

## 2024-07-12 ENCOUNTER — NON-APPOINTMENT (OUTPATIENT)
Age: 59
End: 2024-07-12

## 2024-07-16 ENCOUNTER — APPOINTMENT (OUTPATIENT)
Dept: ORTHOPEDIC SURGERY | Facility: CLINIC | Age: 59
End: 2024-07-16

## 2024-08-06 ENCOUNTER — APPOINTMENT (OUTPATIENT)
Dept: ORTHOPEDIC SURGERY | Facility: CLINIC | Age: 59
End: 2024-08-06

## 2024-08-14 NOTE — ED PROVIDER NOTE - OBJECTIVE STATEMENT
Noted thank you.   57F PMH GERD (on omeprazole), presents to the ED with heart racing palpitations and lightheadedness for the past few days. Pt states that she has GERD and feels like it's worse than usual over the past month and she hasn't been eating much because of it and the omeprazole doesn't seem to be working. Pt went to [facility] ED last night and had CT chest/abd/pelvis done without acute findings, pts labwork/trop was reassuring besides blood glucose in the 60s. Pts UA showed WBC and esterase  as well as ketones and pt was given dose of ceftriaxone and abx sent to pharmacy. Pt states that last night at 8pm pt got haloperidol and she feels like her lightheadedness/palpitations got worse. Reportedly she was offered admission but left. Denies fevers/chills, headaches/vision changes, uri sxs, cough, chest pain/sob, v/d, dark/bloody stools, urinary sxs, rash.

## 2025-09-05 ENCOUNTER — EMERGENCY (EMERGENCY)
Facility: HOSPITAL | Age: 60
LOS: 1 days | End: 2025-09-05
Admitting: EMERGENCY MEDICINE
Payer: MEDICAID

## 2025-09-05 VITALS
RESPIRATION RATE: 16 BRPM | HEART RATE: 98 BPM | HEIGHT: 63 IN | WEIGHT: 149.91 LBS | SYSTOLIC BLOOD PRESSURE: 167 MMHG | OXYGEN SATURATION: 98 % | TEMPERATURE: 98 F | DIASTOLIC BLOOD PRESSURE: 95 MMHG

## 2025-09-05 PROCEDURE — 99284 EMERGENCY DEPT VISIT MOD MDM: CPT

## 2025-09-05 RX ORDER — LIDOCAINE HYDROCHLORIDE 20 MG/ML
10 JELLY TOPICAL ONCE
Refills: 0 | Status: COMPLETED | OUTPATIENT
Start: 2025-09-05 | End: 2025-09-05

## 2025-09-05 RX ORDER — PREDNISONE 20 MG/1
50 TABLET ORAL ONCE
Refills: 0 | Status: COMPLETED | OUTPATIENT
Start: 2025-09-05 | End: 2025-09-05

## 2025-09-05 RX ORDER — DEXAMETHASONE 0.5 MG/1
6 TABLET ORAL ONCE
Refills: 0 | Status: DISCONTINUED | OUTPATIENT
Start: 2025-09-05 | End: 2025-09-05

## 2025-09-05 RX ORDER — METHYLPREDNISOLONE ACETATE 80 MG/ML
1 INJECTION, SUSPENSION INTRA-ARTICULAR; INTRALESIONAL; INTRAMUSCULAR; SOFT TISSUE
Qty: 21 | Refills: 0
Start: 2025-09-05 | End: 2025-09-10

## 2025-09-05 RX ADMIN — LIDOCAINE HYDROCHLORIDE 10 MILLILITER(S): 20 JELLY TOPICAL at 18:04

## 2025-09-05 RX ADMIN — PREDNISONE 50 MILLIGRAM(S): 20 TABLET ORAL at 18:57
